# Patient Record
Sex: FEMALE | Race: WHITE | NOT HISPANIC OR LATINO | Employment: FULL TIME | URBAN - METROPOLITAN AREA
[De-identification: names, ages, dates, MRNs, and addresses within clinical notes are randomized per-mention and may not be internally consistent; named-entity substitution may affect disease eponyms.]

---

## 2017-04-14 ENCOUNTER — TRANSFERRED RECORDS (OUTPATIENT)
Dept: HEALTH INFORMATION MANAGEMENT | Facility: CLINIC | Age: 42
End: 2017-04-14

## 2017-05-31 ENCOUNTER — TRANSFERRED RECORDS (OUTPATIENT)
Dept: HEALTH INFORMATION MANAGEMENT | Facility: CLINIC | Age: 42
End: 2017-05-31

## 2017-06-30 ENCOUNTER — TRANSFERRED RECORDS (OUTPATIENT)
Dept: HEALTH INFORMATION MANAGEMENT | Facility: CLINIC | Age: 42
End: 2017-06-30

## 2017-07-19 ENCOUNTER — TRANSFERRED RECORDS (OUTPATIENT)
Dept: HEALTH INFORMATION MANAGEMENT | Facility: CLINIC | Age: 42
End: 2017-07-19

## 2020-03-11 ENCOUNTER — TRANSFERRED RECORDS (OUTPATIENT)
Dept: HEALTH INFORMATION MANAGEMENT | Facility: CLINIC | Age: 45
End: 2020-03-11

## 2020-06-26 ENCOUNTER — TRANSFERRED RECORDS (OUTPATIENT)
Dept: HEALTH INFORMATION MANAGEMENT | Facility: CLINIC | Age: 45
End: 2020-06-26

## 2020-12-28 ENCOUNTER — TRANSFERRED RECORDS (OUTPATIENT)
Dept: HEALTH INFORMATION MANAGEMENT | Facility: CLINIC | Age: 45
End: 2020-12-28

## 2020-12-29 ENCOUNTER — TRANSFERRED RECORDS (OUTPATIENT)
Dept: HEALTH INFORMATION MANAGEMENT | Facility: CLINIC | Age: 45
End: 2020-12-29

## 2020-12-31 ENCOUNTER — TRANSFERRED RECORDS (OUTPATIENT)
Dept: HEALTH INFORMATION MANAGEMENT | Facility: CLINIC | Age: 45
End: 2020-12-31

## 2021-01-04 ENCOUNTER — MEDICAL CORRESPONDENCE (OUTPATIENT)
Dept: HEALTH INFORMATION MANAGEMENT | Facility: CLINIC | Age: 46
End: 2021-01-04

## 2021-01-05 ENCOUNTER — TRANSCRIBE ORDERS (OUTPATIENT)
Dept: OTHER | Age: 46
End: 2021-01-05

## 2021-01-05 ENCOUNTER — PATIENT OUTREACH (OUTPATIENT)
Dept: ONCOLOGY | Facility: CLINIC | Age: 46
End: 2021-01-05

## 2021-01-05 ENCOUNTER — DOCUMENTATION ONLY (OUTPATIENT)
Dept: ONCOLOGY | Facility: CLINIC | Age: 46
End: 2021-01-05

## 2021-01-05 DIAGNOSIS — D07.1 VIN III (VULVAR INTRAEPITHELIAL NEOPLASIA III): Primary | ICD-10-CM

## 2021-01-05 NOTE — PROGRESS NOTES
Action    Action Taken 1/5/21:    -Spoke w/ Patient:    -[facility]. Dr. Ban Mcwilliams. Last seen in 2017.     -Biopsy done around 2015 in clinic (Gyn/Fertility). No other bx/diagnostic/imaging done since 2015    -No imaging done. Has imaging scheduled, but advised me is it     -Emailed pt ASHLEY to pt for [facility] recs.     -[facility] Recs pulled thru CE    1/6/21: Records from [facility] rec'd - sent to HIM for upload    -1/13/21: Slides from [facility] rec'd, sent to 5th floor lab @ [facility] [place]  2:53 PM       RECORDS STATUS - ALL OTHER DIAGNOSIS      RECORDS RECEIVED FROM: [facility] ([facility])   DATE RECEIVED:    NOTES STATUS DETAILS   OFFICE NOTE from referring provider CE - Cristiane Siddiqui: 12/28/20    Recs from [facility] recs/Dr. Ban Mcwilliams sent to HIM for upload 1/6   OFFICE NOTE from medical oncologist     DISCHARGE SUMMARY from hospital     DISCHARGE REPORT from the ER     OPERATIVE REPORT     MEDICATION LIST     CLINICAL TRIAL TREATMENTS TO DATE     LABS     PATHOLOGY REPORTS [facility] Reports in CE 12/28/20: [id number]   ANYTHING RELATED TO DIAGNOSIS     GENONOMIC TESTING     TYPE:     IMAGING (NEED IMAGES & REPORT)     CT SCANS     MRI     MAMMO     ULTRASOUND     PET

## 2021-01-05 NOTE — PROGRESS NOTES
New Patient Oncology Nurse Navigator Note     Referring provider: Brandon Siddiqui MD    Referring Clinic/Organization: [facility]      Referred to: Gyn/Onc    Requested provider (if applicable): First available - did not specify     Referral Received: 1/5/21     Evaluation for : DEVIN III (vulvar intraepithelial neoplasia III)     Clinical History (per Nurse review of records provided):      Patient presented to Gyn with two month history of chronic vulvar symptoms, refractory to any topical treatment. Vulvar biopsy was performed, pathology showed: HIGH GRADE SQUAMOUS INTRAEPITHELIAL LESION (SEVERE DYSPLASIA/VULVAR INTRAEPITHELIAL NEOPLASIA III).    Clinical Assessment / Barriers to Care (Per Nurse):    Spoke with patient, she is ready for scheduling. Confirmed with patient that she has not had a pelvic US as ordered by Dr. Siddiqui at the 12/28 visit, that was unrelated to skin concern and was for perimenopausal bleeding per patient. Patient went in for US and they recommended transvaginal, patient unable to do due to pain from biopsy sites. Pelvic US is deferred for now. Of note, patient requests female Gyn Onc provider.    Records Location: [facility] Everywhere   Faxed - Media tab/Scanned     Records Needed:     None    Additional testing needed prior to consult:     None    Referral updates and Plan:     Referred on for scheduling.      Summer Vance, RN, BSN, OCN    [facility]  [phone]

## 2021-01-06 NOTE — TELEPHONE ENCOUNTER
ONCOLOGY INTAKE: Records Information      APPT INFORMATION:  Referring provider:  Dr. Brandon Mello  Referring provider s clinic:  [facility]  Reason for visit/diagnosis:  DEVIN III (vulvar intraepithelial neoplasia III)  Has patient been notified of appointment date and time?: Yes    RECORDS INFORMATION:  Were the records received with the referral (via Rightfax)? No,Internal Referral      Has patient been seen for any external appt for this diagnosis? No    If yes, where? NA      ADDITIONAL INFORMATION:  None

## 2021-01-14 ENCOUNTER — ONCOLOGY VISIT (OUTPATIENT)
Dept: ONCOLOGY | Facility: CLINIC | Age: 46
End: 2021-01-14
Attending: OBSTETRICS & GYNECOLOGY
Payer: COMMERCIAL

## 2021-01-14 ENCOUNTER — PRE VISIT (OUTPATIENT)
Dept: ONCOLOGY | Facility: CLINIC | Age: 46
End: 2021-01-14

## 2021-01-14 VITALS
OXYGEN SATURATION: 98 % | SYSTOLIC BLOOD PRESSURE: 170 MMHG | BODY MASS INDEX: 27.11 KG/M2 | RESPIRATION RATE: 16 BRPM | WEIGHT: 153 LBS | TEMPERATURE: 98 F | HEART RATE: 110 BPM | HEIGHT: 63 IN | DIASTOLIC BLOOD PRESSURE: 89 MMHG

## 2021-01-14 DIAGNOSIS — D07.1 VIN III (VULVAR INTRAEPITHELIAL NEOPLASIA III): ICD-10-CM

## 2021-01-14 DIAGNOSIS — B37.31 YEAST INFECTION OF THE VAGINA: Primary | ICD-10-CM

## 2021-01-14 PROCEDURE — G0463 HOSPITAL OUTPT CLINIC VISIT: HCPCS | Mod: 25

## 2021-01-14 PROCEDURE — G0145 SCR C/V CYTO,THINLAYER,RESCR: HCPCS | Performed by: OBSTETRICS & GYNECOLOGY

## 2021-01-14 PROCEDURE — 999N001032 HC STATISTIC REVIEW OUTSIDE SLIDES TC 88321: Performed by: OBSTETRICS & GYNECOLOGY

## 2021-01-14 PROCEDURE — G0463 HOSPITAL OUTPT CLINIC VISIT: HCPCS

## 2021-01-14 PROCEDURE — 87624 HPV HI-RISK TYP POOLED RSLT: CPT | Performed by: OBSTETRICS & GYNECOLOGY

## 2021-01-14 PROCEDURE — 99205 OFFICE O/P NEW HI 60 MIN: CPT | Performed by: OBSTETRICS & GYNECOLOGY

## 2021-01-14 RX ORDER — SUMATRIPTAN 50 MG/1
TABLET, FILM COATED ORAL
COMMUNITY
Start: 2019-12-11 | End: 2023-03-17

## 2021-01-14 RX ORDER — MULTIPLE VITAMINS W/ MINERALS TAB 9MG-400MCG
1 TAB ORAL DAILY
COMMUNITY

## 2021-01-14 RX ORDER — SPIRONOLACTONE 50 MG/1
50 TABLET, FILM COATED ORAL
COMMUNITY
Start: 2020-10-06

## 2021-01-14 RX ORDER — CHLORAL HYDRATE 500 MG
1 CAPSULE ORAL
COMMUNITY

## 2021-01-14 RX ORDER — OMEGA-3 FATTY ACIDS/FISH OIL 300-1000MG
200 CAPSULE ORAL EVERY 4 HOURS PRN
Status: ON HOLD | COMMUNITY
End: 2021-01-29

## 2021-01-14 RX ORDER — ZINC GLUCONATE 50 MG
50 TABLET ORAL DAILY
COMMUNITY
End: 2023-03-17

## 2021-01-14 RX ORDER — METHOCARBAMOL 750 MG/1
750 TABLET, FILM COATED ORAL
COMMUNITY
Start: 2020-06-18

## 2021-01-14 RX ORDER — FLUCONAZOLE 150 MG/1
150 TABLET ORAL ONCE
Qty: 1 TABLET | Refills: 1 | Status: SHIPPED | OUTPATIENT
Start: 2021-01-14 | End: 2021-01-14

## 2021-01-14 RX ORDER — AZELASTINE 1 MG/ML
1 SPRAY, METERED NASAL
COMMUNITY

## 2021-01-14 RX ORDER — CEFDINIR 300 MG/1
300 CAPSULE ORAL 2 TIMES DAILY
COMMUNITY
Start: 2021-01-07 | End: 2021-01-27

## 2021-01-14 RX ORDER — LIDOCAINE HYDROCHLORIDE 20 MG/ML
JELLY TOPICAL
COMMUNITY
Start: 2020-12-31 | End: 2023-03-17

## 2021-01-14 RX ORDER — DROSPIRENONE, ETHINYL ESTRADIOL AND LEVOMEFOLATE CALCIUM AND LEVOMEFOLATE CALCIUM 3-0.02(24)
1 KIT ORAL
COMMUNITY
Start: 2020-10-12

## 2021-01-14 RX ORDER — CEFAZOLIN SODIUM 2 G/50ML
2 SOLUTION INTRAVENOUS
Status: CANCELLED | OUTPATIENT
Start: 2021-01-14

## 2021-01-14 RX ORDER — FLUCONAZOLE 150 MG/1
TABLET ORAL
COMMUNITY
Start: 2021-01-07

## 2021-01-14 RX ORDER — LEVOTHYROXINE SODIUM 75 UG/1
TABLET ORAL
COMMUNITY
Start: 2020-12-31

## 2021-01-14 RX ORDER — VALACYCLOVIR HYDROCHLORIDE 1 G/1
1000 TABLET, FILM COATED ORAL DAILY
COMMUNITY
Start: 2020-04-06

## 2021-01-14 RX ORDER — CEFAZOLIN SODIUM 1 G/50ML
1 INJECTION, SOLUTION INTRAVENOUS SEE ADMIN INSTRUCTIONS
Status: CANCELLED | OUTPATIENT
Start: 2021-01-14

## 2021-01-14 SDOH — HEALTH STABILITY: MENTAL HEALTH: HOW MANY STANDARD DRINKS CONTAINING ALCOHOL DO YOU HAVE ON A TYPICAL DAY?: NOT ASKED

## 2021-01-14 SDOH — HEALTH STABILITY: MENTAL HEALTH: HOW OFTEN DO YOU HAVE A DRINK CONTAINING ALCOHOL?: 2-4 TIMES A MONTH

## 2021-01-14 SDOH — HEALTH STABILITY: MENTAL HEALTH: HOW OFTEN DO YOU HAVE 6 OR MORE DRINKS ON ONE OCCASION?: NOT ASKED

## 2021-01-14 ASSESSMENT — MIFFLIN-ST. JEOR: SCORE: 1318.13

## 2021-01-14 ASSESSMENT — PAIN SCALES - GENERAL: PAINLEVEL: NO PAIN (0)

## 2021-01-14 NOTE — NURSING NOTE
SURGERY CONSENT COMPLETED AND SCANNED     Relevant Diagnosis:   Teaching Topic: Colposcopy, Wide local excision of vulva         Person(s) involved in teaching :  Patient Alone   Motivation Level:  Asks Questions:    Yes      Eager to Learn:     Yes     Cooperative:          Yes    Receptive (willing. Able to accept information):    Yes      Patient and those who are listed above demonstrates understanding of the following:   Reason for the appointment, diagnosis and treatment plan:   Yes   Knowledge of proper use of medications and conditions for which they are ordered (with special attention to potential side effects or drug interactions): Yes   Which situations necessitate calling provider and whom to contact: Yes         Nutritional needs and diet plan:  Yes      Pain management techniques:     Yes, Pain Scale   Diet:   Yes, [facility] Diet Instructions    Teaching Concerns addressed: Yes    Infection Prevention:  Patient and those who are listed above demonstrate understanding of the following:  Pre-Op CHG Bathing Instructions: Yes  Surgical procedure site care taught:   Yes   Signs and symptoms of infection taught: Yes       Instructional Materials Used/Given:  The [place] Before You Surgery Booklet  Showering or Bathing before Surgery Instructions & CHG Product  Pain Assessment Tool   Home Care after Major Abdominal or Vaginal Surgery  Map  Copy of Surgical Consent    Comments:  JACKI Hayward RN

## 2021-01-14 NOTE — PROGRESS NOTES
GYNECOLOGIC  ONCOLOGY CONSULT      RE: Sushila Nicholson  : 1975  AMELIE: 2021    CC: High grade vulva dysplasia    HPI: Ms Sushila Nicholson is a 45 year old  female who presents for consultation regarding DEVIN III.     Today she is referred by Dr. Brandon Siddiqui with the below work up.    Patient reports recent symptoms of vulva irritation sine . Previous attempt at treatment with estradiol, steroid and antifungal cream did not resolve the issue. She continues to experience persistent and severe discomfort. She is under treatment for hypothyroid disease. She also experiences yeast infection with any antibiotic treatment. She is completing a course of antibiotics for sinus infection and has Diflucan prescription to take.  LMP in 2020. She was scheduled to undergo a vaginal pelvic US but could not tolerate this due to the vulva lesions, she prefers to follow up on this issue separately with her local OB/GYN    20 Lesion along perianal area (midline and along right)  Biopsy- high grade squamous intraepithelial lesion  HSV Type 1/2 negative    OBGYN history and Health Maintenance:  G0  Last Pap Smear: 2017 reports as Neg HPV negative  HPV Vaccine in her 20's      Review of Systems:  Systemic:No weight changes.    Skin : No skin changes or new lesions.   Eye : No changes in vision.   Pulmonary: No cough or SOB.   Cardiovascular: No CP or palpitations  Gastrointestinal : No diarrhea, constipation, abdominal pain. Bowel habits normal.   Genitourinary: No dysuria, urgency or bleeding, vulva pruritis  Psychiatric: No depression or anxiety   Hematologic : No palpable lymph nodes.   Endocrine : No hot flashes. No heat/cold intolerance.      Neurological: No headaches, no numbness.     Past Medical History:   Diagnosis Date     Acquired hypothyroidism      Anxiety      Bilateral occipital neuralgia      Disorder of thyroid        Past Surgical History:   Procedure Laterality Date     BREAST BIOPSY, CORE RT/LT             Current Outpatient Medications   Medication Sig Dispense Refill     azelastine (ASTELIN) 0.1 % nasal spray Spray 1 spray in nostril       cefdinir (OMNICEF) 300 MG capsule Take 300 mg by mouth 2 times daily       DHEA 25 MG CAPS Take 5 mg by mouth       fish oil-omega-3 fatty acids 1000 MG capsule Take 1 capsule by mouth       fluconazole (DIFLUCAN) 150 MG tablet        ibuprofen (ADVIL/MOTRIN) 200 MG capsule Take 200 mg by mouth every 4 hours as needed for fever       methocarbamol (ROBAXIN) 750 MG tablet Take 750 mg by mouth       multivitamin w/minerals (MULTI-VITAMIN) tablet Take 1 tablet by mouth daily       naltrexone (REVIA) 1 mg/mL SOLN Take by mouth once 0.5 AM 4.5 PM       Probiotic Product (PROBIOTIC-10 PO)        spironolactone (ALDACTONE) 100 MG tablet Take 100 mg by mouth       SYNTHROID 100 MCG tablet TAKE 1 TABLET BY MOUTH ONCE DAILY IN THE MORNING ON AN EMPTY STOMACH       vitamin D3 (CHOLECALCIFEROL) 1.25 MG (63316 UT) capsule        zinc gluconate 50 MG tablet Take 50 mg by mouth daily       Drospiren-Eth Estrad-Levomefol 3-0.02-0.451 MG TABS Take 1 tablet by mouth       lidocaine (XYLOCAINE) 2 % external gel AS DIRECTED FOR 1 DOSE       SUMAtriptan (IMITREX) 50 MG tablet TAKE 1 TABLET(50 MG) BY MOUTH AT ONSET OF HEADACHE AS DIRECTED. MAY REPEAT IN 2 HOURS AS NEEDED       valACYclovir (VALTREX) 1000 mg tablet Take 1,000 mg by mouth daily           Allergies   Allergen Reactions     Azithromycin Hives       Social History:  Social History     Tobacco Use     Smoking status: Never Smoker     Smokeless tobacco: Never Used   Substance Use Topics     Alcohol use: Yes     Frequency: 2-4 times a month         Family History:   The patient's family history is notable for no ovarian, breast or colon cancer  History reviewed. No pertinent family history.      Physical Exam:     BP (!) 170/89 (BP Location: Right arm, Patient Position: Chair, Cuff Size: Adult Regular)   Pulse 110   Temp 98  F  "(36.7  C)   Resp 16   Ht 1.6 m (5' 3\")   Wt 69.4 kg (153 lb)   LMP 09/14/2020   SpO2 98%   BMI 27.10 kg/m    Body mass index is 27.1 kg/m .    General: Alert and oriented, no acute distress  Psych: Mood stable  Cardiovascular: RRR, no murmors  Pulmonary: Lungs clear . Normal respiratory effort  GI: No distention. No masses. No hernia.   Lymph: No enlarge lymph nodes in neck or groin  : Normal external genitalia. Vagina without lesion, small cervix, midline uterus no adnexal mass.    Vulva visualized after application with acetic acid- aceto-white epithelium along posterior left posterior vulva extending to fourchette, lesion 2 cm.      Assessment: Sushila Nicholson is a 45 year old woman with a new diagnosis of high grade vulva dysplasia confirmed by biopsy.    Vulva area visualized confined to perineum and extending to left distal vulva, vagina without lesions.    Cervical cancer screening- pap obtained today     Plan:     1.)   Discussed treatment recommendation with surgery to excise the high grade lesion with goal to obtain a negative margin. There is limited skin in this area. Discussed risk and benefits of surgery including risk of infection. Surgical consent obtained.     Reviewed management options for wound care after surgery.     2.)    Patient will follow up for the Pelvic US with her Gyn team    3.) Labs and/or tests ordered include:  UPT morning of surgery       Edilma Daley M.D., MPH,  F.A.C.O.G.  Professor  Department of Ob/Gyn and Women's Health  Division of Gynecologic Oncology  [facility]  [phone]        A total of 60 minutes was spent with the patient, > 50% of time was spent  in counseling the patient and/or treatment planning.    "

## 2021-01-14 NOTE — NURSING NOTE
"Oncology Rooming Note    January 14, 2021 10:54 AM   Sushila Nicholson is a 45 year old female who presents for:    Chief Complaint   Patient presents with     Oncology Clinic Visit     [facility] 3     Initial Vitals: BP (!) 170/89 (BP Location: Right arm, Patient Position: Chair, Cuff Size: Adult Regular)   Pulse 110   Temp 98  F (36.7  C)   Resp 16   Ht 1.6 m (5' 3\")   Wt 69.4 kg (153 lb)   LMP 09/14/2020   SpO2 98%   BMI 27.10 kg/m   Estimated body mass index is 27.1 kg/m  as calculated from the following:    Height as of this encounter: 1.6 m (5' 3\").    Weight as of this encounter: 69.4 kg (153 lb). Body surface area is 1.76 meters squared.  No Pain (0) Comment: Data Unavailable   Patient's last menstrual period was 09/14/2020.  Allergies reviewed: Yes  Medications reviewed: Yes    Medications: Medication refills not needed today.  Pharmacy name entered into [facility]:    [id number] - [place], [place] - [address] AT [facility] OF [address]  [id number] - [place], [place] - [address]    Clinical concerns: No new concerns. Thuy was notified.      Sridhar Jarrett LPN            "

## 2021-01-15 ENCOUNTER — TELEPHONE (OUTPATIENT)
Dept: ONCOLOGY | Facility: CLINIC | Age: 46
End: 2021-01-15

## 2021-01-15 DIAGNOSIS — Z11.59 ENCOUNTER FOR SCREENING FOR OTHER VIRAL DISEASES: Primary | ICD-10-CM

## 2021-01-15 PROBLEM — D07.1 VIN III (VULVAR INTRAEPITHELIAL NEOPLASIA III): Status: ACTIVE | Noted: 2021-01-15

## 2021-01-15 PROCEDURE — 88321 CONSLTJ&REPRT SLD PREP ELSWR: CPT | Performed by: PATHOLOGY

## 2021-01-15 NOTE — TELEPHONE ENCOUNTER
Surgery is scheduled with Dr. Daley on 1/29 at [place].  Scheduled per openings.    H&P: to be completed by Dr. Daley or PCP.    COVID-19 test: will be at [facility]   P: [phone]  F: [phone]  COVID orders will be faxed to them and Sushila will get the COVID test on 1/25. I spoke with the clinic and they said they get results within 3 days and can have the results faxed to us by 1/29.    Post-op: 2/18 as a in person visit.    The RN completed the education regarding the surgery.     Patient will receive a phone call from pre-admission nurses 1-2 days prior to surgery with arrival and start time.    The surgery packet was provided by the RN during appointment.    Patient will contact clinic near home to schedule COVID-19 test 2-4 days prior to surgery.    I called the patient and was able to confirm the scheduled information above.

## 2021-01-18 LAB — COPATH REPORT: NORMAL

## 2021-01-19 LAB
COPATH REPORT: NORMAL
PAP: NORMAL

## 2021-01-20 LAB
FINAL DIAGNOSIS: ABNORMAL
HPV HR 12 DNA CVX QL NAA+PROBE: NEGATIVE
HPV16 DNA SPEC QL NAA+PROBE: NEGATIVE
HPV18 DNA SPEC QL NAA+PROBE: POSITIVE
SPECIMEN DESCRIPTION: ABNORMAL
SPECIMEN SOURCE CVX/VAG CYTO: ABNORMAL

## 2021-01-22 ENCOUNTER — TELEPHONE (OUTPATIENT)
Dept: TRANSPLANT | Facility: CLINIC | Age: 46
End: 2021-01-22

## 2021-01-22 ENCOUNTER — TELEPHONE (OUTPATIENT)
Dept: ONCOLOGY | Facility: CLINIC | Age: 46
End: 2021-01-22

## 2021-01-22 NOTE — TELEPHONE ENCOUNTER
Sushila left voicemail that [place] did not get COVID orders.    Spoke with [place], who knew fax went through. Already left for the day but would ask someone in the clinic to fax orders again.    Called Sushila to explain this, also noted that this is why we try to do COVID tests within our system.    Scheduled a COVID test on 1/27 at [place] as back up so that if she does not get in at [place] on 1/25, she will do the test within our system.     No further questions.

## 2021-01-22 NOTE — TELEPHONE ENCOUNTER
----- Message from Bhavani Hayward RN sent at 1/22/2021  4:12 PM CST -----  Regarding: FAX ORDER URGENT  Patient clinic did not get fax    Can you re-fax this as her appt is Monday morning to assure surgery goes as planned.    Please fax the COVID orders to - Fax #[phone]    Can you add a note to the cover letter fax the results back to us at - Fax #[phone]    THANK YOU    Juli  ----- Message -----  From: Melinda Cage  Sent: 1/15/2021   1:01 PM CST  To: Bhavani Hayward, RN    [facility] said they can get the results sent to us within the 4 day window.    Please fax the COVID orders to - Fax #[phone]    Can you add a note to fax the results back to us at - Fax #[phone]    Also can you add that the test is needed on 1/25? :-)

## 2021-01-25 ENCOUNTER — TELEPHONE (OUTPATIENT)
Dept: ONCOLOGY | Facility: CLINIC | Age: 46
End: 2021-01-25

## 2021-01-25 NOTE — TELEPHONE ENCOUNTER
Received VM from Sushila.    Did her COVID test in [place] this morning.    Clinic stated they didn't get a fax # to send results to (but this is on the cover sheet) and so Sushila provided them with [phone].    Message to RN to watch for fax with results.

## 2021-01-26 ENCOUNTER — PATIENT OUTREACH (OUTPATIENT)
Dept: ONCOLOGY | Facility: CLINIC | Age: 46
End: 2021-01-26

## 2021-01-26 NOTE — PROGRESS NOTES
Per Care Everywhere 1/25/2021:     COVID-19/SARS-CoV2, PCR or NAAT Negative     Surgery as planned.    Bhavani Hayward RN

## 2021-01-28 ENCOUNTER — ANESTHESIA EVENT (OUTPATIENT)
Dept: SURGERY | Facility: CLINIC | Age: 46
End: 2021-01-28
Payer: COMMERCIAL

## 2021-01-28 ENCOUNTER — TRANSFERRED RECORDS (OUTPATIENT)
Dept: HEALTH INFORMATION MANAGEMENT | Facility: CLINIC | Age: 46
End: 2021-01-28

## 2021-01-29 ENCOUNTER — ANESTHESIA (OUTPATIENT)
Dept: SURGERY | Facility: CLINIC | Age: 46
End: 2021-01-29
Payer: COMMERCIAL

## 2021-01-29 ENCOUNTER — HOSPITAL ENCOUNTER (OUTPATIENT)
Facility: CLINIC | Age: 46
Discharge: HOME | End: 2021-01-29
Attending: OBSTETRICS & GYNECOLOGY | Admitting: OBSTETRICS & GYNECOLOGY
Payer: COMMERCIAL

## 2021-01-29 VITALS
HEIGHT: 63 IN | RESPIRATION RATE: 14 BRPM | TEMPERATURE: 98.5 F | OXYGEN SATURATION: 97 % | WEIGHT: 151.46 LBS | BODY MASS INDEX: 26.84 KG/M2 | SYSTOLIC BLOOD PRESSURE: 129 MMHG | HEART RATE: 95 BPM | DIASTOLIC BLOOD PRESSURE: 79 MMHG

## 2021-01-29 DIAGNOSIS — D07.1 VIN III (VULVAR INTRAEPITHELIAL NEOPLASIA III): ICD-10-CM

## 2021-01-29 DIAGNOSIS — D07.1 VIN III (VULVAR INTRAEPITHELIAL NEOPLASIA III): Primary | ICD-10-CM

## 2021-01-29 LAB
B-HCG SERPL-ACNC: <1 IU/L (ref 0–5)
GLUCOSE BLDC GLUCOMTR-MCNC: 81 MG/DL (ref 70–99)
HCG UR QL: NEGATIVE
HGB BLD-MCNC: 15.4 G/DL (ref 11.7–15.7)

## 2021-01-29 PROCEDURE — 250N000013 HC RX MED GY IP 250 OP 250 PS 637: Performed by: ANESTHESIOLOGY

## 2021-01-29 PROCEDURE — 710N000012 HC RECOVERY PHASE 2, PER MINUTE: Performed by: OBSTETRICS & GYNECOLOGY

## 2021-01-29 PROCEDURE — 88305 TISSUE EXAM BY PATHOLOGIST: CPT | Mod: TC | Performed by: OBSTETRICS & GYNECOLOGY

## 2021-01-29 PROCEDURE — 999N001017 HC STATISTIC GLUCOSE BY METER IP

## 2021-01-29 PROCEDURE — 999N000141 HC STATISTIC PRE-PROCEDURE NURSING ASSESSMENT: Performed by: OBSTETRICS & GYNECOLOGY

## 2021-01-29 PROCEDURE — 81025 URINE PREGNANCY TEST: CPT | Performed by: ANESTHESIOLOGY

## 2021-01-29 PROCEDURE — 360N000075 HC SURGERY LEVEL 2, PER MIN: Performed by: OBSTETRICS & GYNECOLOGY

## 2021-01-29 PROCEDURE — 250N000009 HC RX 250: Performed by: OBSTETRICS & GYNECOLOGY

## 2021-01-29 PROCEDURE — 250N000009 HC RX 250: Performed by: NURSE ANESTHETIST, CERTIFIED REGISTERED

## 2021-01-29 PROCEDURE — 84702 CHORIONIC GONADOTROPIN TEST: CPT | Performed by: OBSTETRICS & GYNECOLOGY

## 2021-01-29 PROCEDURE — 272N000001 HC OR GENERAL SUPPLY STERILE: Performed by: OBSTETRICS & GYNECOLOGY

## 2021-01-29 PROCEDURE — 250N000011 HC RX IP 250 OP 636: Performed by: OBSTETRICS & GYNECOLOGY

## 2021-01-29 PROCEDURE — 250N000011 HC RX IP 250 OP 636: Performed by: NURSE ANESTHETIST, CERTIFIED REGISTERED

## 2021-01-29 PROCEDURE — 36415 COLL VENOUS BLD VENIPUNCTURE: CPT | Performed by: OBSTETRICS & GYNECOLOGY

## 2021-01-29 PROCEDURE — 88309 TISSUE EXAM BY PATHOLOGIST: CPT | Mod: 26 | Performed by: PATHOLOGY

## 2021-01-29 PROCEDURE — 258N000003 HC RX IP 258 OP 636: Performed by: NURSE ANESTHETIST, CERTIFIED REGISTERED

## 2021-01-29 PROCEDURE — 88309 TISSUE EXAM BY PATHOLOGIST: CPT | Mod: TC | Performed by: OBSTETRICS & GYNECOLOGY

## 2021-01-29 PROCEDURE — 85018 HEMOGLOBIN: CPT | Performed by: OBSTETRICS & GYNECOLOGY

## 2021-01-29 PROCEDURE — 710N000010 HC RECOVERY PHASE 1, LEVEL 2, PER MIN: Performed by: OBSTETRICS & GYNECOLOGY

## 2021-01-29 PROCEDURE — 370N000017 HC ANESTHESIA TECHNICAL FEE, PER MIN: Performed by: OBSTETRICS & GYNECOLOGY

## 2021-01-29 PROCEDURE — 258N000003 HC RX IP 258 OP 636: Performed by: OBSTETRICS & GYNECOLOGY

## 2021-01-29 PROCEDURE — 250N000025 HC SEVOFLURANE, PER MIN: Performed by: OBSTETRICS & GYNECOLOGY

## 2021-01-29 PROCEDURE — 88305 TISSUE EXAM BY PATHOLOGIST: CPT | Mod: 26 | Performed by: PATHOLOGY

## 2021-01-29 RX ORDER — FENTANYL CITRATE 50 UG/ML
25-50 INJECTION, SOLUTION INTRAMUSCULAR; INTRAVENOUS
Status: DISCONTINUED | OUTPATIENT
Start: 2021-01-29 | End: 2021-01-29 | Stop reason: HOSPADM

## 2021-01-29 RX ORDER — NALOXONE HYDROCHLORIDE 0.4 MG/ML
0.2 INJECTION, SOLUTION INTRAMUSCULAR; INTRAVENOUS; SUBCUTANEOUS
Status: DISCONTINUED | OUTPATIENT
Start: 2021-01-29 | End: 2021-01-29 | Stop reason: HOSPADM

## 2021-01-29 RX ORDER — DEXMEDETOMIDINE HYDROCHLORIDE 4 UG/ML
0.2-1.2 INJECTION, SOLUTION INTRAVENOUS CONTINUOUS
Status: DISCONTINUED | OUTPATIENT
Start: 2021-01-29 | End: 2021-01-29 | Stop reason: HOSPADM

## 2021-01-29 RX ORDER — ACETAMINOPHEN 325 MG/1
650 TABLET ORAL EVERY 4 HOURS PRN
Qty: 50 TABLET | Refills: 0 | Status: SHIPPED | OUTPATIENT
Start: 2021-01-29

## 2021-01-29 RX ORDER — ACETAMINOPHEN 325 MG/1
975 TABLET ORAL ONCE
Status: COMPLETED | OUTPATIENT
Start: 2021-01-29 | End: 2021-01-29

## 2021-01-29 RX ORDER — KETOROLAC TROMETHAMINE 30 MG/ML
INJECTION, SOLUTION INTRAMUSCULAR; INTRAVENOUS PRN
Status: DISCONTINUED | OUTPATIENT
Start: 2021-01-29 | End: 2021-01-29

## 2021-01-29 RX ORDER — MEPERIDINE HYDROCHLORIDE 25 MG/ML
12.5 INJECTION INTRAMUSCULAR; INTRAVENOUS; SUBCUTANEOUS
Status: DISCONTINUED | OUTPATIENT
Start: 2021-01-29 | End: 2021-01-29 | Stop reason: HOSPADM

## 2021-01-29 RX ORDER — ONDANSETRON 2 MG/ML
INJECTION INTRAMUSCULAR; INTRAVENOUS PRN
Status: DISCONTINUED | OUTPATIENT
Start: 2021-01-29 | End: 2021-01-29

## 2021-01-29 RX ORDER — CEFAZOLIN SODIUM 2 G/100ML
2 INJECTION, SOLUTION INTRAVENOUS
Status: COMPLETED | OUTPATIENT
Start: 2021-01-29 | End: 2021-01-29

## 2021-01-29 RX ORDER — CEFAZOLIN SODIUM 1 G/3ML
1 INJECTION, POWDER, FOR SOLUTION INTRAMUSCULAR; INTRAVENOUS SEE ADMIN INSTRUCTIONS
Status: DISCONTINUED | OUTPATIENT
Start: 2021-01-29 | End: 2021-01-29 | Stop reason: HOSPADM

## 2021-01-29 RX ORDER — ONDANSETRON 4 MG/1
4 TABLET, ORALLY DISINTEGRATING ORAL EVERY 30 MIN PRN
Status: DISCONTINUED | OUTPATIENT
Start: 2021-01-29 | End: 2021-01-29 | Stop reason: HOSPADM

## 2021-01-29 RX ORDER — FENTANYL CITRATE 50 UG/ML
INJECTION, SOLUTION INTRAMUSCULAR; INTRAVENOUS PRN
Status: DISCONTINUED | OUTPATIENT
Start: 2021-01-29 | End: 2021-01-29

## 2021-01-29 RX ORDER — NALOXONE HYDROCHLORIDE 0.4 MG/ML
0.4 INJECTION, SOLUTION INTRAMUSCULAR; INTRAVENOUS; SUBCUTANEOUS
Status: DISCONTINUED | OUTPATIENT
Start: 2021-01-29 | End: 2021-01-29 | Stop reason: HOSPADM

## 2021-01-29 RX ORDER — SODIUM CHLORIDE, SODIUM LACTATE, POTASSIUM CHLORIDE, CALCIUM CHLORIDE 600; 310; 30; 20 MG/100ML; MG/100ML; MG/100ML; MG/100ML
INJECTION, SOLUTION INTRAVENOUS CONTINUOUS PRN
Status: DISCONTINUED | OUTPATIENT
Start: 2021-01-29 | End: 2021-01-29

## 2021-01-29 RX ORDER — IBUPROFEN 600 MG/1
600 TABLET, FILM COATED ORAL EVERY 6 HOURS PRN
Qty: 30 TABLET | Refills: 0 | Status: SHIPPED | OUTPATIENT
Start: 2021-01-29

## 2021-01-29 RX ORDER — DEXAMETHASONE SODIUM PHOSPHATE 4 MG/ML
INJECTION, SOLUTION INTRA-ARTICULAR; INTRALESIONAL; INTRAMUSCULAR; INTRAVENOUS; SOFT TISSUE PRN
Status: DISCONTINUED | OUTPATIENT
Start: 2021-01-29 | End: 2021-01-29

## 2021-01-29 RX ORDER — SODIUM CHLORIDE, SODIUM LACTATE, POTASSIUM CHLORIDE, CALCIUM CHLORIDE 600; 310; 30; 20 MG/100ML; MG/100ML; MG/100ML; MG/100ML
INJECTION, SOLUTION INTRAVENOUS CONTINUOUS
Status: DISCONTINUED | OUTPATIENT
Start: 2021-01-29 | End: 2021-01-29 | Stop reason: HOSPADM

## 2021-01-29 RX ORDER — ONDANSETRON 2 MG/ML
4 INJECTION INTRAMUSCULAR; INTRAVENOUS EVERY 30 MIN PRN
Status: DISCONTINUED | OUTPATIENT
Start: 2021-01-29 | End: 2021-01-29 | Stop reason: HOSPADM

## 2021-01-29 RX ORDER — PROPOFOL 10 MG/ML
INJECTION, EMULSION INTRAVENOUS PRN
Status: DISCONTINUED | OUTPATIENT
Start: 2021-01-29 | End: 2021-01-29

## 2021-01-29 RX ORDER — NALTREXONE HYDROCHLORIDE 50 MG/1
0.5 TABLET, FILM COATED ORAL DAILY
COMMUNITY
End: 2021-02-18

## 2021-01-29 RX ORDER — BUPIVACAINE HYDROCHLORIDE 2.5 MG/ML
INJECTION, SOLUTION INFILTRATION; PERINEURAL PRN
Status: DISCONTINUED | OUTPATIENT
Start: 2021-01-29 | End: 2021-01-29 | Stop reason: HOSPADM

## 2021-01-29 RX ORDER — KETAMINE HYDROCHLORIDE 10 MG/ML
INJECTION INTRAMUSCULAR; INTRAVENOUS PRN
Status: DISCONTINUED | OUTPATIENT
Start: 2021-01-29 | End: 2021-01-29

## 2021-01-29 RX ORDER — LIDOCAINE HYDROCHLORIDE 20 MG/ML
INJECTION, SOLUTION INFILTRATION; PERINEURAL PRN
Status: DISCONTINUED | OUTPATIENT
Start: 2021-01-29 | End: 2021-01-29

## 2021-01-29 RX ORDER — ACETAMINOPHEN 325 MG/1
650 TABLET ORAL
Status: DISCONTINUED | OUTPATIENT
Start: 2021-01-29 | End: 2021-01-29 | Stop reason: HOSPADM

## 2021-01-29 RX ADMIN — KETAMINE HYDROCHLORIDE 0.1 MG/KG/HR: 100 INJECTION, SOLUTION, CONCENTRATE INTRAMUSCULAR; INTRAVENOUS at 11:12

## 2021-01-29 RX ADMIN — ROCURONIUM BROMIDE 50 MG: 10 INJECTION INTRAVENOUS at 10:59

## 2021-01-29 RX ADMIN — Medication 30 MG: at 10:59

## 2021-01-29 RX ADMIN — FENTANYL CITRATE 50 MCG: 50 INJECTION, SOLUTION INTRAMUSCULAR; INTRAVENOUS at 11:03

## 2021-01-29 RX ADMIN — HYDROMORPHONE HYDROCHLORIDE 0.5 MG: 1 INJECTION, SOLUTION INTRAMUSCULAR; INTRAVENOUS; SUBCUTANEOUS at 11:25

## 2021-01-29 RX ADMIN — ACETAMINOPHEN 975 MG: 325 TABLET, FILM COATED ORAL at 09:52

## 2021-01-29 RX ADMIN — DEXMEDETOMIDINE 0.4 MCG/KG/HR: 100 INJECTION, SOLUTION, CONCENTRATE INTRAVENOUS at 11:11

## 2021-01-29 RX ADMIN — PROPOFOL 200 MG: 10 INJECTION, EMULSION INTRAVENOUS at 10:59

## 2021-01-29 RX ADMIN — ONDANSETRON 4 MG: 2 INJECTION INTRAMUSCULAR; INTRAVENOUS at 11:44

## 2021-01-29 RX ADMIN — FENTANYL CITRATE 50 MCG: 50 INJECTION, SOLUTION INTRAMUSCULAR; INTRAVENOUS at 10:54

## 2021-01-29 RX ADMIN — SUGAMMADEX 200 MG: 100 INJECTION, SOLUTION INTRAVENOUS at 12:12

## 2021-01-29 RX ADMIN — MIDAZOLAM 2 MG: 1 INJECTION INTRAMUSCULAR; INTRAVENOUS at 10:38

## 2021-01-29 RX ADMIN — KETOROLAC TROMETHAMINE 30 MG: 30 INJECTION, SOLUTION INTRAMUSCULAR at 12:08

## 2021-01-29 RX ADMIN — LIDOCAINE HYDROCHLORIDE 60 MG: 20 INJECTION, SOLUTION INFILTRATION; PERINEURAL at 10:59

## 2021-01-29 RX ADMIN — DEXAMETHASONE SODIUM PHOSPHATE 8 MG: 4 INJECTION, SOLUTION INTRA-ARTICULAR; INTRALESIONAL; INTRAMUSCULAR; INTRAVENOUS; SOFT TISSUE at 11:27

## 2021-01-29 RX ADMIN — CEFAZOLIN 2 G: 10 INJECTION, POWDER, FOR SOLUTION INTRAVENOUS at 11:14

## 2021-01-29 RX ADMIN — SODIUM CHLORIDE, POTASSIUM CHLORIDE, SODIUM LACTATE AND CALCIUM CHLORIDE: 600; 310; 30; 20 INJECTION, SOLUTION INTRAVENOUS at 10:25

## 2021-01-29 RX ADMIN — FENTANYL CITRATE 50 MCG: 50 INJECTION, SOLUTION INTRAMUSCULAR; INTRAVENOUS at 11:43

## 2021-01-29 ASSESSMENT — MIFFLIN-ST. JEOR: SCORE: 1311.13

## 2021-01-29 NOTE — OP NOTE
[facility]  Operative Note    Name: Sushila Nicholson  MRN: [id number]  : 1975  Date of Surgery: 2021    Pre-operative Diagnosis:   - Vulvar intraepithelial neoplasia III  - HPV 18 positive pap smear    Post-operative Diagnosis:   - Same, s/p below stated procedure  - Inadequate colposcopy    Procedure(s):   1. Exam under anesthesia  2. Colposcopy with cervical biopsy and endocervical curettage  3. Vulvar colposcopy  4. Wide local excision of vulva    Surgeon: Edilma Daley MD   Assistants: Bhavani Watson MD PGY-3    Anesthesia: General endotracheal, local  EBL: 10 mL   Urine Output: voided on call to the OR  Fluids: 1000 mL crystalloid    Specimens: cervical biopsy (4 o'clock position), endocervical curettage, vulvar perineum stitch at left apex.  Complications: None apparent  Indications: Sushila Nicholson is a 45 year old who initially presented with vulvar irritation that was refractory to topical medications. Ultimately, a vulvar biopsy was performed which demonstrated DEVIN III. A pap smear was also obtained which was NILM, HPV 18 positive. She elected to proceed with the above procedure. The risks and benefits of and alternatives to the procedure were discussed with the patient, all questions were answered and written informed consent was obtained.     Findings: Exam under anesthesia revealed normal vulvar architecture. Cervix and vagina smooth without nodularity/masses. Uterus small and mobile, no palpable adnexal masses. On coloposcopy, transformation zone not visualized. Upon application of acetic acid, small area of aceto-white changes and increased vascularity noted at the 4 o'clock position.  Biopsy obtained at this position. ECC obtained. On vulvar colposcopy, aceto-white changes noted along the posterior fourchette and perineum, greater density noted on the left. Hemostasis noted upon completion of the case.    Procedure Details: The patient was taken to the OR  where she was placed in the dorsal lithotomy position with feet in [place] stirrups. General anesthesia was administered. An exam under anesthesia was performed. The patient was then prepped and draped in the usual sterile fashion.    A speculum was placed in th vagina with excellent visualization of the cervix, which appeared normal. The transformation zone was not visible. Acetic acid was applied to the cervix. The colposcope was then utilized with a small area of aceto-white change noted at the 4 o'clock position. There was also increased vascularity in this area. A biopsy was obtained at this location using a Tischler biopsy forceps. The specimen was sent to pathology. Hemostasis was achieved with pressure. An endocervical curettage was performed and the specimen was sent to pathology. The speculum was then removed.    Attention was turned to the vulva. Acetic acid was applied and the colposcope was used to inspect the entire vulva. Aceto-white epithelium was noted involving the posterior vulva, left > right, extending to the posterior fourchette and perineum.  0.25% bupivacaine was then injected into the tissue overlying and immediately surrounding the abnormal tissue.  A wide area around the lesion was marked (approximately 4x3cm, butteryfly-shape) and a superficial incision made using a scalpel.  The incision was carried through the subcutaneous tissue with electrocautery.  The specimen was removed, marked at the left apex with suture, and sent to pathology.  The skin was then closely re-approximated with 4-0 vicryl using vertical mattress and running subcuticular sutures. Excellent re-approximation and hemostasis was noted.    Dr. Daley was present for the procedure. The patient tolerated the procedure well, was extubated in the OR, and transferred to the PACU in stable condition.    Bhavani Watson MD  Ob/Gyn PGY-3  January 29, 2021 4:59 PM    As the primary surgeon, I was scrubbed and present for the  full course of the procedure.    Edilma Daley M.D.

## 2021-01-29 NOTE — ANESTHESIA CARE TRANSFER NOTE
Patient: Sushila Nicholson    Procedure(s):  Colposcopy, Wide local excision of vulva, CERVICAL BIOPSY, ENDOMETRIAL CURRETTAGE,    Diagnosis: DEVIN III (vulvar intraepithelial neoplasia III) [D07.1]  Diagnosis Additional Information: No value filed.    Anesthesia Type:   General     Note:    Oropharynx: oropharynx clear of all foreign objects  Level of Consciousness: awake  Oxygen Supplementation: face mask  Level of Supplemental Oxygen: 6L  Independent Airway: airway patency satisfactory and stable  Dentition: dentition unchanged  Vital Signs Stable: post-procedure vital signs reviewed and stable  Report to RN Given: handoff report given  Patient transferred to: PACU  Comments: Patient transported to PACU, denies pain. Tachycardic, consistent with pre-induction HR. Spontaneously breathing.  Handoff Report: Identifed the Patient, Identified the Reponsible Provider, Reviewed the pertinent medical history, Discussed the surgical course, Reviewed Intra-OP anesthesia mangement and issues during anesthesia, Set expectations for post-procedure period and Allowed opportunity for questions and acknowledgement of understanding      Vitals: (Last set prior to Anesthesia Care Transfer)  CRNA VITALS  1/29/2021 1154 - 1/29/2021 1239      1/29/2021             EKG:  Sinus tachycardia        Electronically Signed By: LANA Hurst CRNA  January 29, 2021  12:39 PM

## 2021-01-29 NOTE — ANESTHESIA POSTPROCEDURE EVALUATION
Patient: Sushila Nicholson    Procedure(s):  Colposcopy, Wide local excision of vulva, CERVICAL BIOPSY, ENDOMETRIAL CURRETTAGE,    Diagnosis:DEVIN III (vulvar intraepithelial neoplasia III) [D07.1]  Diagnosis Additional Information: No value filed.    Anesthesia Type:  General    Note:  Disposition: Outpatient   Postop Pain Control: Uneventful            Sign Out: Well controlled pain   PONV: No   Neuro/Psych: Uneventful            Sign Out: Acceptable/Baseline neuro status   Airway/Respiratory: Uneventful            Sign Out: Acceptable/Baseline resp. status   CV/Hemodynamics: Uneventful            Sign Out: Acceptable CV status   Other NRE:    DID A NON-ROUTINE EVENT OCCUR?          Last vitals:  Vitals:    01/29/21 1315 01/29/21 1330 01/29/21 1345   BP: 127/86 126/83 125/82   Pulse: 103 102 95   Resp: 16 13 9   Temp:  36.9  C (98.5  F)    SpO2: 99% 97% 97%       Electronically Signed By: Ovidio Dailey MD  January 29, 2021  2:22 PM

## 2021-01-29 NOTE — DISCHARGE INSTRUCTIONS
[facility]  Same-Day Surgery   Adult Discharge Orders & Instructions     For 24 hours after surgery    1. Get plenty of rest.  A responsible adult must stay with you for at least 24 hours after you leave the hospital.   2. Do not drive or use heavy equipment.  If you have weakness or tingling, don't drive or use heavy equipment until this feeling goes away.  3. Do not drink alcohol.  4. Avoid strenuous or risky activities.  Ask for help when climbing stairs.   5. You may feel lightheaded.  IF so, sit for a few minutes before standing.  Have someone help you get up.   6. If you have nausea (feel sick to your stomach): Drink only clear liquids such as apple juice, ginger ale, broth or 7-Up.  Rest may also help.  Be sure to drink enough fluids.  Move to a regular diet as you feel able.  7. You may have a slight fever. Call the doctor if your fever is over 100 F (37.7 C) (taken under the tongue) or lasts longer than 24 hours.  8. You may have a dry mouth, a sore throat, muscle aches or trouble sleeping.  These should go away after 24 hours.  9. Do not make important or legal decisions.   Call your doctor for any of the followin.  Signs of infection (fever, growing tenderness at the surgery site, a large amount of drainage or bleeding, severe pain, foul-smelling drainage, redness, swelling).    2. It has been over 8 to 10 hours since surgery and you are still not able to urinate (pass water).    3.  Headache for over 24 hours.    To contact Dr Daley's clinic call [phone] or:        [phone] and ask for the resident on call for Gynecology (answered 24 hours a day)      Emergency Department:  [facility]: [phone]

## 2021-01-29 NOTE — BRIEF OP NOTE
[facility]     Brief Operative Note    Pre-operative diagnosis: DEVIN III (vulvar intraepithelial neoplasia III)      HPV 18 positive pap smear  Post-operative diagnosis Same as pre-operative diagnosis    Procedure: Procedure(s):  Colposcopy, Wide local excision of vulva  Surgeon: Surgeon(s) and Role:     * Edilma Daley MD - Primary     * Bhavani Watson MD - Resident - Assisting  Anesthesia: General   Estimated blood loss: Less than 10 ml  Drains: None  Specimens:   ID Type Source Tests Collected by Time Destination   A : CERVICAL BIOPSY AT FOUR O'CLOCK Biopsy Cervix SURGICAL PATHOLOGY EXAM Edilma Daley MD 1/29/2021 11:24 AM    B : ENDOCERVICAL CURETTINGS Tissue Cervix SURGICAL PATHOLOGY EXAM Edilma Daley MD 1/29/2021 11:29 AM    C : VULVAR PERINEUM STITCH  AT LEFT APEX Tissue Vulva SURGICAL PATHOLOGY EXAM Edilma Daley MD 1/29/2021 11:48 AM      Findings: On colposcopy, cervix grossly appears normal. With application of acetic acid small area of acetowhite changes and abnormal appearing vessels noted a the 4 o'clock position. Biopsy obtained. Unable to visualize transformation zone and thus an ECC was performed. On vulvar colposcopy, area of acetowhite changes along the posterior fourchette and perineum, L>R. Hemostasis noted at the end of the case  Complications: None.  Implants: * No implants in log *    Bhavani Watson MD PGY3

## 2021-01-29 NOTE — PROGRESS NOTES
0556 Dr Walter coronado.  Patient benefited from narcs during surgery. She has a two hour drive home. Can she get a discharge script for oxy. Awaiting return call.

## 2021-01-29 NOTE — ANESTHESIA PREPROCEDURE EVALUATION
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]   Procedure : Procedure(s):  Colposcopy, Wide local excision of vulva     Past Medical History:   Diagnosis Date     Acquired hypothyroidism      Anxiety      Bilateral occipital neuralgia      Disorder of thyroid       Past Surgical History:   Procedure Laterality Date     BREAST BIOPSY, CORE RT/LT        Allergies   Allergen Reactions     Azithromycin Hives      Social History     Tobacco Use     Smoking status: Never Smoker     Smokeless tobacco: Never Used   Substance Use Topics     Alcohol use: Yes     Frequency: 2-4 times a month     Comment: once a week      Wt Readings from Last 1 Encounters:   21 68.7 kg (151 lb 7.3 oz)        Anesthesia Evaluation   Pt has not had prior anesthetic         ROS/MED HX  ENT/Pulmonary:       Neurologic:       Cardiovascular:       METS/Exercise Tolerance: >4 METS    Hematologic:       Musculoskeletal:       GI/Hepatic:       Renal/Genitourinary:       Endo:     (+) thyroid problem, hypothyroidism,     Psychiatric/Substance Use:       Infectious Disease:       Malignancy:       Other:            Physical Exam    Airway        Mallampati: II   TM distance: > 3 FB   Neck ROM: full   Mouth opening: > 3 cm    Respiratory Devices and Support         Dental  no notable dental history         Cardiovascular          Rhythm and rate: regular and tachycardia     Pulmonary           breath sounds clear to auscultation           OUTSIDE LABS:  CBC:   Lab Results   Component Value Date    HGB 15.4 2021     BMP: No results found for: NA, POTASSIUM, CHLORIDE, CO2, BUN, CR, GLC  COAGS: No results found for: PTT, INR, FIBR  POC:   Lab Results   Component Value Date    BGM 81 2021    HCG Negative 2021     HEPATIC: No results found for: ALBUMIN, PROTTOTAL, ALT, AST, GGT, ALKPHOS, BILITOTAL,  BILIDIRECT, DARNELL  OTHER: No results found for: PH, LACT, A1C, JOSE, PHOS, MAG, LIPASE, AMYLASE, TSH, T4, T3, CRP, SED    Anesthesia Plan     History & Physical Review      ASA Status:  2. NPO Status:  NPO Appropriate. .  Plan for General with Intravenous induction. Device: ETT Maintenance will be Balanced.     Additional equipment: 2nd IV.    PONV prophylaxis:  Ondansetron (or other 5HT-3) and Dexamethasone or Solumedrol.    Comments: Patient takes oral naltrexone for her thyroid disease. Option of spinal vs GA with multinodal analgesia discussed with patient. She decided to go ahead with GA with multimodal analgesia..       Consents  Anesthesia Plan(s) and associated risks, benefits, and realistic alternatives discussed.    Questions answered and patient/representative(s) expressed understanding.    Discussed with:  Patient.       Extended Intubation/Ventilatory Support Discussed No No     Use of blood products discussed: No.          Postoperative Care  Postoperative pain management: IV analgesics and Oral pain medications.           Ovidio Dailey MD

## 2021-01-29 NOTE — ANESTHESIA PROCEDURE NOTES
Airway   Date/Time: 1/29/2021 11:03 AM   Patient location during procedure: OR  Staff -   Anesthesiologist:  Ovidio Dailey MD  CRNA: Giovana Tamayo APRN CRNA  Other Anesthesia Staff: Gabriel Treadwell APRN CRNA  Performed By: CRNA    Consent for Airway   Urgency: elective    Indications and Patient Condition  Indications for airway management: figueroa-procedural  Induction type:intravenousMask difficulty assessment: 1 - vent by mask    Final Airway Details  Final airway type: endotracheal airway  Successful airway:ETT - single  Endotracheal Airway Details   ETT size (mm): 7.0  Cuffed: yes  Successful intubation technique: direct laryngoscopy  Grade View of Cords: 1  Adjucts: stylet  Measured from: lips  Secured at (cm): 22  Secured with: pink tape  Bite block used: None    Post intubation assessment   Placement verified by: capnometry, equal breath sounds and chest rise   Number of attempts at approach: 1  Secured with:pink tape  Ease of procedure: easy  Dentition: Intact

## 2021-02-02 ENCOUNTER — PATIENT OUTREACH (OUTPATIENT)
Dept: ONCOLOGY | Facility: CLINIC | Age: 46
End: 2021-02-02

## 2021-02-02 ENCOUNTER — DOCUMENTATION ONLY (OUTPATIENT)
Dept: ONCOLOGY | Facility: CLINIC | Age: 46
End: 2021-02-02

## 2021-02-02 NOTE — PROGRESS NOTES
RN reached out to patient for post hospital call.     Patient overall feeling well but had some general questions in regards to pain and headache.     Patient woke up from surgery with a headache and this did not improve over the weekend. Patient took Tylenol and Ibuprofen as instructed but this did not touch the headache. Patient eventually took migraine medication and headache improved and did go away.     Patient stopped taking her Robaxin as she was told no other pain medication could be prescribed if she was on this. Patient not requesting anything at this time but wanted us aware. Surgical pain controlled via tylenol and ibuprofen at this time.    Patient denies any signs and symptoms of concern. Normal healing process and what to expect reviewed.     All patients questions and concerns answered to the best of her ability.

## 2021-02-02 NOTE — CONFIDENTIAL NOTE
Patient stated that she did not have a great surgical experience nor did she have a good experience when calling triage yesterday.     RN used active listening and apologized.     Patient appreciative of the RN time.    Bhavani Hayward RN

## 2021-02-04 LAB — COPATH REPORT: NORMAL

## 2021-02-17 NOTE — PROGRESS NOTES
GYNECOLOGIC  ONCOLOGY CLINIC NOTE      RE: Sushila Nicholson  : 1975  AMELIE: 2021      CC: High grade vulva dysplasia    HPI: Ms Sushila Nicholson is a 45 year old  female who presents for follow up regarding surgical treatment of DEVIN III.     Since surgery Sushila reports some vulva discomfort. No abnormal discharge. She feels the discomfort when sitting for longer periods      Treatment History:    Patient reports recent symptoms of vulva irritation sine . Previous attempt at treatment with estradiol, steroid and antifungal cream did not resolve the issue.   LMP in 2020. She was scheduled to undergo a vaginal pelvic US but could not tolerate this due to the vulva lesions, she prefers to follow up on this issue separately with her local OB/GYN    20 Lesion along perianal area (midline and along right)  Biopsy- high grade squamous intraepithelial lesion  HSV Type 1/2 negative    2021 Pap- NILM, HPV 18 +    2021 Surgery: 1. Exam under anesthesia, Colposcopy with cervical biopsy and endocervical curettage, Vulvar colposcopy, Wide local excision of vulva  Pathology- FINAL DIAGNOSIS:   A. Unremarkable Benign cervical epithelium; negative for dysplasia     B. ENDOCERVIX, CURETTAGE:   - Low grade squamous intraepithelial lesion (cervical intraepithelial   neoplasia 1)     C. 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)   - Margins are negative for high grade dysplasia       OBGYN history and Health Maintenance:  G0  Last Pap Smear: 2021 Pap- NILM, HPV 18 +  HPV Vaccine in her 20's      Review of Systems:  See patient completed ROS, reviewed    Past Medical History:   Diagnosis Date     Acquired hypothyroidism      Anxiety      Bilateral occipital neuralgia      Disorder of thyroid        Past Surgical History:   Procedure Laterality Date     BREAST BIOPSY, CORE RT/LT       EXCISE VULVA WIDE LOCAL  Bilateral 1/29/2021    Procedure: Colposcopy, Wide local excision of vulva, CERVICAL BIOPSY, ENDOMETRIAL CURRETTAGE,;  Surgeon: Edilma Daley MD;  Location:  OR          Current Outpatient Medications   Medication Sig Dispense Refill     acetaminophen (TYLENOL) 325 MG tablet Take 2 tablets (650 mg) by mouth every 4 hours as needed for mild pain 50 tablet 0     azelastine (ASTELIN) 0.1 % nasal spray Spray 1 spray in nostril       DHEA 25 MG CAPS Take 5 mg by mouth       Drospiren-Eth Estrad-Levomefol 3-0.02-0.451 MG TABS Take 1 tablet by mouth       fish oil-omega-3 fatty acids 1000 MG capsule Take 1 capsule by mouth       fluconazole (DIFLUCAN) 150 MG tablet        ibuprofen (ADVIL/MOTRIN) 600 MG tablet Take 1 tablet (600 mg) by mouth every 6 hours as needed for other (mild and/or inflammatory pain) 30 tablet 0     lidocaine (XYLOCAINE) 2 % external gel AS DIRECTED FOR 1 DOSE       methocarbamol (ROBAXIN) 750 MG tablet Take 750 mg by mouth       multivitamin w/minerals (MULTI-VITAMIN) tablet Take 1 tablet by mouth daily       naltrexone (DEPADE/REVIA) 50 MG tablet Take 0.5 mg by mouth daily       naltrexone (REVIA) 1 mg/mL SOLN Take by mouth once 0.5 AM 4.5 PM       Probiotic Product (PROBIOTIC-10 PO)        spironolactone (ALDACTONE) 100 MG tablet Take 100 mg by mouth       SUMAtriptan (IMITREX) 50 MG tablet TAKE 1 TABLET(50 MG) BY MOUTH AT ONSET OF HEADACHE AS DIRECTED. MAY REPEAT IN 2 HOURS AS NEEDED       SYNTHROID 100 MCG tablet TAKE 1 TABLET BY MOUTH ONCE DAILY IN THE MORNING ON AN EMPTY STOMACH       valACYclovir (VALTREX) 1000 mg tablet Take 1,000 mg by mouth daily       vitamin D3 (CHOLECALCIFEROL) 1.25 MG (08921 UT) capsule        zinc gluconate 50 MG tablet Take 50 mg by mouth daily           Allergies   Allergen Reactions     Azithromycin Hives       Social History:  Social History     Tobacco Use     Smoking status: Never Smoker     Smokeless tobacco: Never Used   Substance Use Topics     Alcohol  "use: Yes     Frequency: 2-4 times a month     Comment: once a week         Family History:   The patient's family history is notable for no ovarian, breast or colon cancer  No family history on file.      Physical Exam:     /79 (BP Location: Right arm, Patient Position: Sitting, Cuff Size: Adult Regular)   Pulse 106   Temp 98  F (36.7  C) (Oral)   Resp 16   Ht 1.6 m (5' 2.99\")   Wt 70.3 kg (154 lb 14.4 oz)   SpO2 96%   BMI 27.45 kg/m    Body mass index is 27.45 kg/m .    General: Alert and oriented, no acute distress  Psych: Mood stable  GI: No distention. No masses. No hernia.   Lymph: No enlarge lymph nodes in neck or groin  : Normal external genitalia. Vulva with incision intact, no erythema.      Assessment: Sushila Nicholson is a 45 year old woman s/p wide local excision for high grade vulva dysplasia.    Pathology reviewed and discussed, margins negative.    Cervical cancer surveillance- diagnosis of MECHE I post screening result of NILM/ HPV 18+      Plan:     1.)   Vulva Dysplasia risk of multifocal disease, recommend continued examination. Follow up in 6 months. Sushila is aware that recurrent symptoms of vulvar pain, itching or discomfort should be examined.     2.)   Cervical MECHE 1- follow up in 1 year for co-teting    3.)    Patient will follow up for the Pelvic US with her Gyn team           Edilma Daley M.D., MPH,  F.A.C.O.G.  Professor  Department of Ob/Gyn and Women's Health  Division of Gynecologic Oncology  [facility]/[employer] [place]  [phone]          "

## 2021-02-18 ENCOUNTER — OFFICE VISIT (OUTPATIENT)
Dept: ONCOLOGY | Facility: CLINIC | Age: 46
End: 2021-02-18
Attending: OBSTETRICS & GYNECOLOGY
Payer: COMMERCIAL

## 2021-02-18 VITALS
RESPIRATION RATE: 16 BRPM | DIASTOLIC BLOOD PRESSURE: 79 MMHG | HEIGHT: 63 IN | TEMPERATURE: 98 F | HEART RATE: 106 BPM | SYSTOLIC BLOOD PRESSURE: 124 MMHG | BODY MASS INDEX: 27.45 KG/M2 | OXYGEN SATURATION: 96 % | WEIGHT: 154.9 LBS

## 2021-02-18 DIAGNOSIS — D07.1 VIN III (VULVAR INTRAEPITHELIAL NEOPLASIA III): Primary | ICD-10-CM

## 2021-02-18 PROCEDURE — 99024 POSTOP FOLLOW-UP VISIT: CPT | Performed by: OBSTETRICS & GYNECOLOGY

## 2021-02-18 ASSESSMENT — PAIN SCALES - GENERAL: PAINLEVEL: MILD PAIN (2)

## 2021-02-18 ASSESSMENT — MIFFLIN-ST. JEOR: SCORE: 1321.62

## 2021-02-18 NOTE — LETTER
2021         RE: Sushila Nicholson  [address] [address]        Dear Colleague,    Thank you for referring your patient, Sushila Nicholson, to the [facility]. Please see a copy of my visit note below.    GYNECOLOGIC  ONCOLOGY CLINIC NOTE      RE: Sushila Nicholson  : 1975  AMELIE: 2021      CC: High grade vulva dysplasia    HPI: Ms Sushila Nicholosn is a 45 year old  female who presents for follow up regarding surgical treatment of DEVIN III.     Since surgery Sushila reports some vulva discomfort. No abnormal discharge. She feels the discomfort when sitting for longer periods      Treatment History:    Patient reports recent symptoms of vulva irritation sine . Previous attempt at treatment with estradiol, steroid and antifungal cream did not resolve the issue.   LMP in 2020. She was scheduled to undergo a vaginal pelvic US but could not tolerate this due to the vulva lesions, she prefers to follow up on this issue separately with her local OB/GYN    20 Lesion along perianal area (midline and along right)  Biopsy- high grade squamous intraepithelial lesion  HSV Type 1/2 negative    2021 Pap- NILM, HPV 18 +    2021 Surgery: 1. Exam under anesthesia, Colposcopy with cervical biopsy and endocervical curettage, Vulvar colposcopy, Wide local excision of vulva  Pathology- FINAL DIAGNOSIS:   A. Unremarkable Benign cervical epithelium; negative for dysplasia     B. ENDOCERVIX, CURETTAGE:   - Low grade squamous intraepithelial lesion (cervical intraepithelial   neoplasia 1)     C. 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)   - Margins are negative for high grade dysplasia       OBGYN history and Health Maintenance:  G0  Last Pap Smear: 2021 Pap- NILM, HPV 18 +  HPV Vaccine in her 's      Review of Systems:  See patient completed ROS, reviewed    Past  Medical History:   Diagnosis Date     Acquired hypothyroidism      Anxiety      Bilateral occipital neuralgia      Disorder of thyroid        Past Surgical History:   Procedure Laterality Date     BREAST BIOPSY, CORE RT/LT       EXCISE VULVA WIDE LOCAL Bilateral 1/29/2021    Procedure: Colposcopy, Wide local excision of vulva, CERVICAL BIOPSY, ENDOMETRIAL CURRETTAGE,;  Surgeon: Edilma Daley MD;  Location: [facility]          Current Outpatient Medications   Medication Sig Dispense Refill     acetaminophen (TYLENOL) 325 MG tablet Take 2 tablets (650 mg) by mouth every 4 hours as needed for mild pain 50 tablet 0     azelastine (ASTELIN) 0.1 % nasal spray Spray 1 spray in nostril       DHEA 25 MG CAPS Take 5 mg by mouth       Drospiren-Eth Estrad-Levomefol 3-0.02-0.451 MG TABS Take 1 tablet by mouth       fish oil-omega-3 fatty acids 1000 MG capsule Take 1 capsule by mouth       fluconazole (DIFLUCAN) 150 MG tablet        ibuprofen (ADVIL/MOTRIN) 600 MG tablet Take 1 tablet (600 mg) by mouth every 6 hours as needed for other (mild and/or inflammatory pain) 30 tablet 0     lidocaine (XYLOCAINE) 2 % external gel AS DIRECTED FOR 1 DOSE       methocarbamol (ROBAXIN) 750 MG tablet Take 750 mg by mouth       multivitamin w/minerals (MULTI-VITAMIN) tablet Take 1 tablet by mouth daily       naltrexone (DEPADE/REVIA) 50 MG tablet Take 0.5 mg by mouth daily       naltrexone (REVIA) 1 mg/mL SOLN Take by mouth once 0.5 AM 4.5 PM       Probiotic Product (PROBIOTIC-10 PO)        spironolactone (ALDACTONE) 100 MG tablet Take 100 mg by mouth       SUMAtriptan (IMITREX) 50 MG tablet TAKE 1 TABLET(50 MG) BY MOUTH AT ONSET OF HEADACHE AS DIRECTED. MAY REPEAT IN 2 HOURS AS NEEDED       SYNTHROID 100 MCG tablet TAKE 1 TABLET BY MOUTH ONCE DAILY IN THE MORNING ON AN EMPTY STOMACH       valACYclovir (VALTREX) 1000 mg tablet Take 1,000 mg by mouth daily       vitamin D3 (CHOLECALCIFEROL) 1.25 MG (16306 UT) capsule        zinc gluconate 50 MG  "tablet Take 50 mg by mouth daily           Allergies   Allergen Reactions     Azithromycin Hives       Social History:  Social History     Tobacco Use     Smoking status: Never Smoker     Smokeless tobacco: Never Used   Substance Use Topics     Alcohol use: Yes     Frequency: 2-4 times a month     Comment: once a week         Family History:   The patient's family history is notable for no ovarian, breast or colon cancer  No family history on file.      Physical Exam:     /79 (BP Location: Right arm, Patient Position: Sitting, Cuff Size: Adult Regular)   Pulse 106   Temp 98  F (36.7  C) (Oral)   Resp 16   Ht 1.6 m (5' 2.99\")   Wt 70.3 kg (154 lb 14.4 oz)   SpO2 96%   BMI 27.45 kg/m    Body mass index is 27.45 kg/m .    General: Alert and oriented, no acute distress  Psych: Mood stable  GI: No distention. No masses. No hernia.   Lymph: No enlarge lymph nodes in neck or groin  : Normal external genitalia. Vulva with incision intact, no erythema.      Assessment: Sushila Nicholson is a 45 year old woman s/p wide local excision for high grade vulva dysplasia.    Pathology reviewed and discussed, margins negative.    Cervical cancer surveillance- diagnosis of MECHE I post screening result of NILM/ HPV 18+      Plan:     1.)   Vulva Dysplasia risk of multifocal disease, recommend continued examination. Follow up in 6 months. Sushila is aware that recurrent symptoms of vulvar pain, itching or discomfort should be examined.     2.)   Cervical MECHE 1- follow up in 1 year for co-teting    3.)    Patient will follow up for the Pelvic US with her Gyn team         Edilma Daley M.D., MPH,  F.A.C.O.G.  Professor  Department of Ob/Gyn and Women's Health  Division of Gynecologic Oncology  [facility] [place]  [phone]          "

## 2021-02-18 NOTE — LETTER
To Whom It MayConcern:       Sushila Nicholson  was seen in our clinic on 2/18/2021        Patient may return to work on  3/8/2021 .                            Restrictions: NO restrictions      Comments: Please call with any questions [phone]         Provider Signature:         Edilma Daley MD

## 2021-02-18 NOTE — NURSING NOTE
"Oncology Rooming Note    February 18, 2021 9:30 AM   Sushila Nicholson is a 46 year old female who presents for:    Chief Complaint   Patient presents with     Oncology Clinic Visit     RETURN; DEVIN III     Initial Vitals: /79 (BP Location: Right arm, Patient Position: Sitting, Cuff Size: Adult Regular)   Pulse 106   Temp 98  F (36.7  C) (Oral)   Resp 16   Ht 1.6 m (5' 2.99\")   Wt 70.3 kg (154 lb 14.4 oz)   SpO2 96%   BMI 27.45 kg/m   Estimated body mass index is 27.45 kg/m  as calculated from the following:    Height as of this encounter: 1.6 m (5' 2.99\").    Weight as of this encounter: 70.3 kg (154 lb 14.4 oz). Body surface area is 1.77 meters squared.  Mild Pain (2) Comment: Data Unavailable   No LMP recorded. Patient is perimenopausal.  Allergies reviewed: Yes  Medications reviewed: Yes    Medications: Medication refills not needed today.  Pharmacy name entered into [facility]:    [id number] - [place], [place] - [address] AT [facility] [address]  [id number] - [place], [place] - [address]    Clinical concerns: Patient would like to discuss removing stitches.        Nicole Aviles CMA              "

## 2021-03-07 ENCOUNTER — HEALTH MAINTENANCE LETTER (OUTPATIENT)
Age: 46
End: 2021-03-07

## 2021-03-09 ENCOUNTER — PATIENT OUTREACH (OUTPATIENT)
Dept: ONCOLOGY | Facility: CLINIC | Age: 46
End: 2021-03-09

## 2021-03-09 PROBLEM — Z98.890 POSTOPERATIVE STATE: Status: ACTIVE | Noted: 2021-03-09

## 2021-03-09 NOTE — PROGRESS NOTES
Patient reached out to RN with concerns about her vulva incision and vaginal itching. Patient has taken two doses of Diflucan with no relief.     RN and patient discuss normal healing after a wide local excision.     RN reccommended patient be seen either locally or with one of our NP's to assure normal healing process.     Patient will see local OBGYN today but also would like an appointment with our NP tomorrow just in case her symptoms are tied to something being wrong due to surgery.     Patient will cancel [place] appointment if not needed.     Bhavani Hayward RN

## 2021-03-12 ENCOUNTER — TELEPHONE (OUTPATIENT)
Dept: ONCOLOGY | Facility: CLINIC | Age: 46
End: 2021-03-12

## 2021-03-12 NOTE — TELEPHONE ENCOUNTER
Spoke to Sushila about her current symptoms including this on going itching which is located right inside the vagina - out side the vagina around the opening and above the clitoris.  Patient states it feels like a horrible yeast infection but she has been examined by her local ObGyn and ruled out for Yeast, Trich, bacteria, gonorrhea, etc.  Patient incisions from the WLE is lower and to the left so it is not bothering her at all, just this non stopping itching.  Patient is not wearing any tight clothing and while at home going with out underwear.  So doing every thing that has been suggested but still suffering to the point that it is waking her at night.  I will reach out to Dr. Daley to see if we have anything that might help her suffering.

## 2021-03-15 NOTE — TELEPHONE ENCOUNTER
Spoke to Sushila- let her know I was able to review her symptoms with a partner of Dr. Daley's and the recommendation is for her to be seen in clinic with one of our MDs and possibly get a biopsy of the area.  I have set the patient up to see Dr. FELICIA Rubi on 3/17 @ 1 pm.

## 2021-03-16 NOTE — PROGRESS NOTES
Patient reached out to RN with OBGYN update.     No yeast infection was found and per OBGYN the incision looked good with no healing concerns.     Patient wanted explaination for itching if there was no yeast and incision looked good.     RN and patient discussed normal healing process and discussed how when incisions heal they sometimes become itchy. However, sometimes there are things such as a stitch that can be irritating things and or other things that may be causing the irritation/itching.     Patient wanted to know if the itching was 100% normal and RN could not give 100% reassurance that was the case. RN stated that the only way to know this would be to have our Gyn/Onc NP do an evaluation. RN recommended she keep the appointment with Britta Feliciano in [place].     Patient was unclear why RN would have had her see her OBGYN. It was reviewed that we had no MD's in clinic and NP schedule was full. OBGYN can evaluate and treat if there was something such as yeast or clear infection.     Patient again asked about options for itching and these were reviewed.     Patient encouraged to keep appt with Gyn/Onc if the itching was still there the next day.     Patient asked how to contact both clinics and asked if there was a way to bypass RN.     Call back numbers for scheduling for both [place] and [facility] given.     Bhavani Hayward RN

## 2021-03-17 ENCOUNTER — ONCOLOGY VISIT (OUTPATIENT)
Dept: ONCOLOGY | Facility: CLINIC | Age: 46
End: 2021-03-17
Attending: OBSTETRICS & GYNECOLOGY
Payer: COMMERCIAL

## 2021-03-17 VITALS — BODY MASS INDEX: 27.16 KG/M2 | WEIGHT: 153.3 LBS | RESPIRATION RATE: 18 BRPM | HEIGHT: 63 IN | OXYGEN SATURATION: 99 %

## 2021-03-17 DIAGNOSIS — L28.0 LICHENIFICATION AND LICHEN SIMPLEX CHRONICUS: Primary | ICD-10-CM

## 2021-03-17 DIAGNOSIS — L30.9 VULVAR DERMATITIS: ICD-10-CM

## 2021-03-17 PROCEDURE — 99215 OFFICE O/P EST HI 40 MIN: CPT | Performed by: OBSTETRICS & GYNECOLOGY

## 2021-03-17 PROCEDURE — 999N000102 HC STATISTIC NO CHARGE CLINIC VISIT

## 2021-03-17 RX ORDER — TRIAMCINOLONE ACETONIDE 1 MG/G
OINTMENT TOPICAL
Qty: 30 G | Refills: 3 | Status: SHIPPED | OUTPATIENT
Start: 2021-03-17 | End: 2023-03-17

## 2021-03-17 RX ORDER — NYSTATIN 100000 U/G
OINTMENT TOPICAL
Qty: 30 G | Refills: 1 | Status: SHIPPED | OUTPATIENT
Start: 2021-03-17 | End: 2023-03-17

## 2021-03-17 RX ORDER — ZINC OXIDE 20 %
OINTMENT (GRAM) TOPICAL
Qty: 60 G | Refills: 3 | Status: SHIPPED | OUTPATIENT
Start: 2021-03-17 | End: 2023-03-17

## 2021-03-17 ASSESSMENT — MIFFLIN-ST. JEOR: SCORE: 1314.33

## 2021-03-17 NOTE — LETTER
"    3/17/2021         RE: Sushila Nicholson  [address] [facility] [address]        Dear Colleague,    Thank you for referring your patient, Sushila Nicholson, to the [facility]. Please see a copy of my visit note below.    Gynecologic Oncology Clinic - Established Patient Visit    Visit date: Mar 17, 2021     CC: vulvar pruritis    Interval history: Sushila Nicholson is a 46 year old pre-menopausal patient who is s/p WLE 1/29/2021 for DEVIN III who presents with severe vulvar pruritis and burning. This is located in the mid to upper vulva and not in the area of prior excision. She has tried topical compounded hydrocortisone-lidocaine-fluconazole ointment without relief. She has also tried a topical anti-itch/analgesic ointment which provides some relief temporarily. She is using benadryl at night. The itching is severe enough to interrupt sleep.She has used baking soda soaks, cool compresses, and ice packs. She used Diflucan which provided a few days of relief.    Of note, she has been amenorrheic for the last several months. Prior to this she had light menses. She has not yet had an ultrasound.    Review of Systems:  Constitutional: no fevers, chills  : as per above  Endo: +thyroid dysfunction, hot flashes    Past Medical History:  Past Medical History:   Diagnosis Date     Acquired hypothyroidism      Anxiety      Bilateral occipital neuralgia      Disorder of thyroid        Past Surgical History:  Past Surgical History:   Procedure Laterality Date     BREAST BIOPSY, CORE RT/LT       EXCISE VULVA WIDE LOCAL Bilateral 1/29/2021    Procedure: Colposcopy, Wide local excision of vulva, CERVICAL BIOPSY, ENDOMETRIAL CURRETTAGE,;  Surgeon: Edilma Daley MD;  Location: [facility] OR        Physical Exam:  Resp 18   Ht 1.6 m (5' 2.99\")   Wt 69.5 kg (153 lb 4.8 oz)   SpO2 99%   BMI 27.16 kg/m     General appearance: no acute distress, well groomed, sitting comfortably   :  External: bilateral " labia major erythematous to about mid-labia, there is lichenification of the bilateral labia, no excoriations, normal architecture, the labia minora, clitoral gonzales, and figueroa-vaginal tissue is erythematous and edematous. Prior WLE site on the left vulva is well healed.    Labs/Pathology:  Reviewed STI testing from local provider    Imaging review:  na    Assessment:  Sushila is a 46 year old pre-menopausal female with recent WLE for DEVIN III now with evidence of vulvar lichen simplex chronicus secondary.    Plan:  - We extensively reviewed the diagnosis, which is usually precipitated by some type of topical irritant/contact dermatitis which then precipitates and itch-scratch cycle, which results in the inflammation and lichenification (thickening of skin) seen on exam. This becomes involuntary with patients often scratching the area during sleep. Oftentimes the offending/inciting agent cannot be identified. The loss of skin barrier can predispose to vulvar yeast infection which serves to exacerbate the cycle.   - Treatment revolves around removing any and all possible irritants. This is outlined below the signature block in Vulvar Skin Care Guidelines.   - In addition, we will do a steroid/anti-fungal taper to try to calm the irritation that exists. She can continue to take antihistamines and use cold compresses and sitz baths as needed.   - Mycolog (nystatin/triamcinolone) applied 2x daily x 2 weeks, then daily x 2 weeks, then 2-3x/week until symptoms resolve. Then as needed. During all other times of day, she should use a skin protectant to the area: zinc oxide (not diaper cream it should be zinc with only petroleum or mineral oil), Vaseline, or olive oil/coconut oil/vegetable oil. Keep the area otherwise as dry as possible.   - We will have a virtual visit in 2 weeks to review how her symptoms are and assess need for any further work-up or monitoring.   - Amenorrhea: recommend pelvic transvaginal ultrasound to assess  endometrial lining, but we will delay this until her vulvar dermatitis is under control to prevent further irritation. Could be related to thyroid dysfunction. Pending ultrasound results she may need FSH/LH testing or progesterone withdrawal. We will readdress after treatment of above to ensure she has appropriate follow-up.    I spent a total of 40 minutes with Sushila Nicholson during today's visit. >50% of that was spent in discussion of her condition and treatment recommendations as documented in this note. In addition I spent 2- minutes documenting this visit and reviewing her outside records related to her vulvar assessments and treatments.     Ciro Mcclellan MD     Gynecologic Oncology         VULVAR SKIN CARE GUIDELINES    NOTE:  The goal is to promote healthy vulvar skin.  This is done by decreasing and removing chemicals, moisture, or rubbing (friction).  Products listed below have been suggested for use because of their past success in helping to decrease or relieve vulvar/vaginal burning, irritation, or itching. The list is not all inclusive and is not meant to promote one product over another.     LAUNDRY PRODUCTS  1. Use the detergent brand ALL FREE CLEAR on all laundry that goes into your washer, every load, and every time.  NO SUBSTITUTIONS.  Use 1/3 to 1/2 the suggested amount per load. For high efficiency machines, use a very small amount of detergent and consider using an extra rinse cycle on undergarments to remove as much detergent as possible.    2. Do not use fabric softeners or dryer sheets in the washer or dryer, even those advertised as  free .  If you use a shared washer or dryer, such as a Laundromat, apartment, or dorm you must hand wash, in ALL FREE CLEAR and line dry your underwear.  You can use dryer balls to help soften clothes.    3. Stain removing products (including bleach).  Soak and rinse in clear water all underwear and towels on which you have used a stain  "removing product.  Then wash in your regular washing cycle using ALL FREE CLEAR. This removes as much of the product as possible.  White vinegar or lemon juice, 1/4 to 1/3 cup per laundry load, can be used to refresh clothing and remove oils.    CLOTHING  1. Wear white all cotton underwear, not nylon with a cotton crotch.  Cotton allows air in and moisture out.  Do not wear underwear when sleeping at night.  Do not wear thongs.  Loose fitting cotton boxers or cotton pajama bottoms are fine.    2. Avoid pantyhose.  If you must wear them, either cut out the brittaney crotch (if you cut out the crotch be sure to leave about 1/4 inch of fabric from the seam to prevent running) or wear thigh high hose.  Many stores now carry thigh high hose.    3. Avoid tight clothing, especially clothing made of synthetic fabrics.  Remove wet bathing and exercise clothing as soon as you can.    BATHING AND HYGIENE  1. Do not use bath soaps, lotions, gels, etc. which contain perfumes.  These may smell nice, but can be irritating.  This includes many baby products and feminine hygiene products marked \"gentle\" or \"mild\".  DOVE FOR SENSITIVE SKIN, NEUTROGENA, BASIS, AVEENO, OR PEARS are the soaps we suggest.  Your partner needs to use one of these soaps also.  Do not use soap directly on the vulvar skin.  Just warm water and your hand will keep the vulvar area clean without irritating the skin.    2. Do not use bubble bath, bath salts, and scented oils.  You may apply a neutral (unscented, non-perfumed) oil or lotion to damp skin after getting out of the tub or shower.  Do not apply lotion directly to the vulva.    3. Do not scrub vulvar skin with a washcloth, washing with your hand and warm water is enough for good cleaning.    4. Pat dry rather than rubbing with a towel.  Or, use a hair dryer on a cool setting to dry the vulva.    5. Baking Soda soaks.  Soak in lukewarm (not hot) bath water with 4-5 tablespoons of baking soda to help soothe " vulvar itching and burning.  Soak 1 to 3 times a day for 10 minutes.  If you are using a sitz bath, use 1 to 2 teaspoons of baking soda.    6. Use white, unscented toilet paper.  Do not use toilet paper with aloe.    7. Do not use feminine hygiene sprays, perfumes, adult, or baby wipes.  You can use Tucks hemorrhoid pads.  If urine causes burning of the skin, pour lukewarm water over the vulva while urinating.  Pat dry rather than wiping.    8. Do not use deodorized pads and tampons.  Tampons may be used when the blood flow is heavy enough to soak one tampon in four hours or less.  Tampons are safe for most women, but wearing them too long or when the blood flow is light may result in vaginal infection, increased discharge, odor, or toxic shock syndrome.  Also, use only pads that have a cotton liner, not nylon mesh weave, that comes in contact with your skin.  Nylon traps moisture and keeps blood and discharge against your skin longer.  We recommend STAYFREE, CAREFREE, or 7th GENERATION.    9. Do not use over-the-counter creams or ointments until you ask your health care provider.  When buying ointments, be sure that they are parable and fragrance-free.    10. Small amounts of extra virgin olive oil, vegetable oil, zinc oxide ointment, or plain Vaseline may be applied to your vulva as often as needed to protect the skin.  It also helps to decrease skin irritation during your period and when you urinate.    11. Do not douche.  Baking soda soaks or rinsing with warm water will help rinse away extra discharge and help with odor.    12. Do not shave or use hair removal products on the vulvar area.  You may use scissors to trim the pubic hair close to the vulva.  Laser hair removal is an option.    13. Some women may have problems with chronic dampness.  Keeping dry is important; however, if at all possible, do not wear pads on a daily basis. Choose cotton fabrics whenever you can.  Keep an extra pair of underwear with  you and change if you become damp. GOLD BOND or ZEASORB powder may be applied to the vulva and groin area 1 to 2 times per day to help absorb moisture in especially hot times of year; however, do not use this regularly if possible. Do not use powders that contain cornstarch.    14. Dryness and irritation during intercourse may be helped by using a lubricant.  Use a small amount of a pure vegetable oil (solid, liquid, or extra virgin olive oil).  These oils contain no chemicals to irritate vulvar/vaginal skin.  Vegetable oils will rinse away with water and will not increase your chances of infection.  Over-the-counter water-based lubricants tend to dry out before intercourse is over, causing small tears in the vagina, and may also contain chemicals that can irritate your vulvar skin.  It may be helpful to use a non-lubricated, non-spermicidal condom, and use vegetable oil as the lubricant.  This will help keep the semen off the skin which can decrease burning and irritation after intercourse.    BIRTH CONTROL OPTIONS  1. The new low-dose oral birth control pills do not increase your chances of getting a yeast infection.    2. Lubricated condoms, contraceptive jellies, creams, or sponges may cause itching and burning.    3. The use of latex condoms with a vegetable oil as a lubricant (#14 above) is suggested to protect your skin. Petroleum-based lubricants may affect the integrity of condoms when used for birth control or prevention of sexually transmitted infections.  Our experience has not found this to be a problem with vegetable-based oils.  However, the Centers for Disease Control recommend that condoms not be used with any oil-based lubricants for birth control or prevention of sexually transmitted disease.    Again, thank you for allowing me to participate in the care of your patient.      Sincerely,      Ciro Mcclellan MD

## 2021-03-18 NOTE — PROGRESS NOTES
"Gynecologic Oncology Clinic - Established Patient Visit    Visit date: Mar 17, 2021     CC: vulvar pruritis    Interval history: Sushila Nicholson is a 46 year old pre-menopausal patient who is s/p WLE 1/29/2021 for DEVIN III who presents with severe vulvar pruritis and burning. This is located in the mid to upper vulva and not in the area of prior excision. She has tried topical compounded hydrocortisone-lidocaine-fluconazole ointment without relief. She has also tried a topical anti-itch/analgesic ointment which provides some relief temporarily. She is using benadryl at night. The itching is severe enough to interrupt sleep.She has used baking soda soaks, cool compresses, and ice packs. She used Diflucan which provided a few days of relief.    Of note, she has been amenorrheic for the last several months. Prior to this she had light menses. She has not yet had an ultrasound.    Review of Systems:  Constitutional: no fevers, chills  : as per above  Endo: +thyroid dysfunction, hot flashes    Past Medical History:  Past Medical History:   Diagnosis Date     Acquired hypothyroidism      Anxiety      Bilateral occipital neuralgia      Disorder of thyroid        Past Surgical History:  Past Surgical History:   Procedure Laterality Date     BREAST BIOPSY, CORE RT/LT       EXCISE VULVA WIDE LOCAL Bilateral 1/29/2021    Procedure: Colposcopy, Wide local excision of vulva, CERVICAL BIOPSY, ENDOMETRIAL CURRETTAGE,;  Surgeon: Edilma Daley MD;  Location: [facility] OR        Physical Exam:  Resp 18   Ht 1.6 m (5' 2.99\")   Wt 69.5 kg (153 lb 4.8 oz)   SpO2 99%   BMI 27.16 kg/m     General appearance: no acute distress, well groomed, sitting comfortably   :  External: bilateral labia major erythematous to about mid-labia, there is lichenification of the bilateral labia, no excoriations, normal architecture, the labia minora, clitoral gonzales, and figueroa-vaginal tissue is erythematous and edematous. Prior WLE site on the left vulva is " well healed.    Labs/Pathology:  Reviewed STI testing from local provider    Imaging review:  na    Assessment:  Sushila is a 46 year old pre-menopausal female with recent WLE for DEVIN III now with evidence of vulvar lichen simplex chronicus secondary.    Plan:  - We extensively reviewed the diagnosis, which is usually precipitated by some type of topical irritant/contact dermatitis which then precipitates and itch-scratch cycle, which results in the inflammation and lichenification (thickening of skin) seen on exam. This becomes involuntary with patients often scratching the area during sleep. Oftentimes the offending/inciting agent cannot be identified. The loss of skin barrier can predispose to vulvar yeast infection which serves to exacerbate the cycle.   - Treatment revolves around removing any and all possible irritants. This is outlined below the signature block in Vulvar Skin Care Guidelines.   - In addition, we will do a steroid/anti-fungal taper to try to calm the irritation that exists. She can continue to take antihistamines and use cold compresses and sitz baths as needed.   - Mycolog (nystatin/triamcinolone) applied 2x daily x 2 weeks, then daily x 2 weeks, then 2-3x/week until symptoms resolve. Then as needed. During all other times of day, she should use a skin protectant to the area: zinc oxide (not diaper cream it should be zinc with only petroleum or mineral oil), Vaseline, or olive oil/coconut oil/vegetable oil. Keep the area otherwise as dry as possible.   - We will have a virtual visit in 2 weeks to review how her symptoms are and assess need for any further work-up or monitoring.   - Amenorrhea: recommend pelvic transvaginal ultrasound to assess endometrial lining, but we will delay this until her vulvar dermatitis is under control to prevent further irritation. Could be related to thyroid dysfunction. Pending ultrasound results she may need FSH/LH testing or progesterone withdrawal. We will  readdress after treatment of above to ensure she has appropriate follow-up.    I spent a total of 40 minutes with Sushila Nicholson during today's visit. >50% of that was spent in discussion of her condition and treatment recommendations as documented in this note. In addition I spent 2- minutes documenting this visit and reviewing her outside records related to her vulvar assessments and treatments.     Ciro Mcclellan MD     Gynecologic Oncology         VULVAR SKIN CARE GUIDELINES    NOTE:  The goal is to promote healthy vulvar skin.  This is done by decreasing and removing chemicals, moisture, or rubbing (friction).  Products listed below have been suggested for use because of their past success in helping to decrease or relieve vulvar/vaginal burning, irritation, or itching. The list is not all inclusive and is not meant to promote one product over another.     LAUNDRY PRODUCTS  1. Use the detergent brand ALL FREE CLEAR on all laundry that goes into your washer, every load, and every time.  NO SUBSTITUTIONS.  Use 1/3 to 1/2 the suggested amount per load. For high efficiency machines, use a very small amount of detergent and consider using an extra rinse cycle on undergarments to remove as much detergent as possible.    2. Do not use fabric softeners or dryer sheets in the washer or dryer, even those advertised as  free .  If you use a shared washer or dryer, such as a Laundromat, apartment, or dorm you must hand wash, in ALL FREE CLEAR and line dry your underwear.  You can use dryer balls to help soften clothes.    3. Stain removing products (including bleach).  Soak and rinse in clear water all underwear and towels on which you have used a stain removing product.  Then wash in your regular washing cycle using ALL FREE CLEAR. This removes as much of the product as possible.  White vinegar or lemon juice, 1/4 to 1/3 cup per laundry load, can be used to refresh clothing and remove  "oils.    CLOTHING  1. Wear white all cotton underwear, not nylon with a cotton crotch.  Cotton allows air in and moisture out.  Do not wear underwear when sleeping at night.  Do not wear thongs.  Loose fitting cotton boxers or cotton pajama bottoms are fine.    2. Avoid pantyhose.  If you must wear them, either cut out the brittaney crotch (if you cut out the crotch be sure to leave about 1/4 inch of fabric from the seam to prevent running) or wear thigh high hose.  Many stores now carry thigh high hose.    3. Avoid tight clothing, especially clothing made of synthetic fabrics.  Remove wet bathing and exercise clothing as soon as you can.    BATHING AND HYGIENE  1. Do not use bath soaps, lotions, gels, etc. which contain perfumes.  These may smell nice, but can be irritating.  This includes many baby products and feminine hygiene products marked \"gentle\" or \"mild\".  DOVE FOR SENSITIVE SKIN, NEUTROGENA, BASIS, AVEENO, OR PEARS are the soaps we suggest.  Your partner needs to use one of these soaps also.  Do not use soap directly on the vulvar skin.  Just warm water and your hand will keep the vulvar area clean without irritating the skin.    2. Do not use bubble bath, bath salts, and scented oils.  You may apply a neutral (unscented, non-perfumed) oil or lotion to damp skin after getting out of the tub or shower.  Do not apply lotion directly to the vulva.    3. Do not scrub vulvar skin with a washcloth, washing with your hand and warm water is enough for good cleaning.    4. Pat dry rather than rubbing with a towel.  Or, use a hair dryer on a cool setting to dry the vulva.    5. Baking Soda soaks.  Soak in lukewarm (not hot) bath water with 4-5 tablespoons of baking soda to help soothe vulvar itching and burning.  Soak 1 to 3 times a day for 10 minutes.  If you are using a sitz bath, use 1 to 2 teaspoons of baking soda.    6. Use white, unscented toilet paper.  Do not use toilet paper with aloe.    7. Do not use " feminine hygiene sprays, perfumes, adult, or baby wipes.  You can use Tucks hemorrhoid pads.  If urine causes burning of the skin, pour lukewarm water over the vulva while urinating.  Pat dry rather than wiping.    8. Do not use deodorized pads and tampons.  Tampons may be used when the blood flow is heavy enough to soak one tampon in four hours or less.  Tampons are safe for most women, but wearing them too long or when the blood flow is light may result in vaginal infection, increased discharge, odor, or toxic shock syndrome.  Also, use only pads that have a cotton liner, not nylon mesh weave, that comes in contact with your skin.  Nylon traps moisture and keeps blood and discharge against your skin longer.  We recommend STAYFREE, CAREFREE, or 7th GENERATION.    9. Do not use over-the-counter creams or ointments until you ask your health care provider.  When buying ointments, be sure that they are parable and fragrance-free.    10. Small amounts of extra virgin olive oil, vegetable oil, zinc oxide ointment, or plain Vaseline may be applied to your vulva as often as needed to protect the skin.  It also helps to decrease skin irritation during your period and when you urinate.    11. Do not douche.  Baking soda soaks or rinsing with warm water will help rinse away extra discharge and help with odor.    12. Do not shave or use hair removal products on the vulvar area.  You may use scissors to trim the pubic hair close to the vulva.  Laser hair removal is an option.    13. Some women may have problems with chronic dampness.  Keeping dry is important; however, if at all possible, do not wear pads on a daily basis. Choose cotton fabrics whenever you can.  Keep an extra pair of underwear with you and change if you become damp. GOLD BOND or ZEASORB powder may be applied to the vulva and groin area 1 to 2 times per day to help absorb moisture in especially hot times of year; however, do not use this regularly if possible.  Do not use powders that contain cornstarch.    14. Dryness and irritation during intercourse may be helped by using a lubricant.  Use a small amount of a pure vegetable oil (solid, liquid, or extra virgin olive oil).  These oils contain no chemicals to irritate vulvar/vaginal skin.  Vegetable oils will rinse away with water and will not increase your chances of infection.  Over-the-counter water-based lubricants tend to dry out before intercourse is over, causing small tears in the vagina, and may also contain chemicals that can irritate your vulvar skin.  It may be helpful to use a non-lubricated, non-spermicidal condom, and use vegetable oil as the lubricant.  This will help keep the semen off the skin which can decrease burning and irritation after intercourse.    BIRTH CONTROL OPTIONS  1. The new low-dose oral birth control pills do not increase your chances of getting a yeast infection.    2. Lubricated condoms, contraceptive jellies, creams, or sponges may cause itching and burning.    3. The use of latex condoms with a vegetable oil as a lubricant (#14 above) is suggested to protect your skin. Petroleum-based lubricants may affect the integrity of condoms when used for birth control or prevention of sexually transmitted infections.  Our experience has not found this to be a problem with vegetable-based oils.  However, the Centers for Disease Control recommend that condoms not be used with any oil-based lubricants for birth control or prevention of sexually transmitted disease.

## 2021-03-18 NOTE — PATIENT INSTRUCTIONS
- Lichen simplex chronicus  - Vulvar dermatitis/contact dermitis  - Prescriptions were sent for Nystatin ointment (anti-fungal) and triamcinolone ointment (low dose topical steroid)  - Use nystatin/triamcinolone as directed starting with 2-3 applications daily and decreasing applications by half every couple of weeks.   - Use skin protectant at all other times if possible.       VULVAR SKIN CARE GUIDELINES    NOTE:  The goal is to promote healthy vulvar skin.  This is done by decreasing and removing chemicals, moisture, or rubbing (friction).  Products listed below have been suggested for use because of their past success in helping to decrease or relieve vulvar/vaginal burning, irritation, or itching. The list is not all inclusive and is not meant to promote one product over another.     LAUNDRY PRODUCTS  1. Use the detergent brand ALL FREE CLEAR on all laundry that goes into your washer, every load, and every time.  NO SUBSTITUTIONS.  Use 1/3 to 1/2 the suggested amount per load. For high efficiency machines, use a very small amount of detergent and consider using an extra rinse cycle on undergarments to remove as much detergent as possible.    2. Do not use fabric softeners or dryer sheets in the washer or dryer, even those advertised as  free .  If you use a shared washer or dryer, such as a Laundromat, apartment, or dorm you must hand wash, in ALL FREE CLEAR and line dry your underwear.  You can use dryer balls to help soften clothes.    3. Stain removing products (including bleach).  Soak and rinse in clear water all underwear and towels on which you have used a stain removing product.  Then wash in your regular washing cycle using ALL FREE CLEAR. This removes as much of the product as possible.  White vinegar or lemon juice, 1/4 to 1/3 cup per laundry load, can be used to refresh clothing and remove oils.    CLOTHING  1. Wear white all cotton underwear, not nylon with a cotton crotch.  Cotton allows air in  "and moisture out.  Do not wear underwear when sleeping at night.  Do not wear thongs.  Loose fitting cotton boxers or cotton pajama bottoms are fine.    2. Avoid tights.  If you must wear them, either cut out the brittaney crotch (if you cut out the crotch be sure to leave about 1/4 inch of fabric from the seam to prevent running) or wear thigh high panty-hose/tights.    3. Avoid tight clothing, especially clothing made of synthetic fabrics.  Remove wet bathing and exercise clothing as soon as you can.    BATHING AND HYGIENE  1. Do not use soap directly on the vulvar skin. Just warm water and your hand will keep the vulvar area clean without irritating the skin. Wash cloths or body poufs can be irritating, so just water and your hand are best. DO NOT use any sort of wipes on the vulvar area - this includes baby wipes (including water wipes), feminine hygiene wipes, or flushable cleansing wipes.    2. You may use anything you like on your face. For the rest of your body, do not use bath soaps, lotions, gels, etc. which contain perfumes. These can be irritating. This even includes many baby products and feminine hygiene products marked \"gentle\" or \"mild\".  DOVE FOR SENSITIVE SKIN, NEUTROGENA, BASIS, AVEENO, CETAPHIL Gentle Cleanser OR CERAVE are soaps we suggest. Your partner needs to use one of these soaps also.      2. Do not use bubble bath, bath salts, and scented oils.  You may apply a neutral (unscented, non-perfumed) oil or lotion to damp skin after getting out of the tub or shower.  Do not apply lotion directly to the vulva.    3. Do not scrub vulvar skin with a washcloth, washing with your hand and warm water is enough for good cleaning.    4. Pat dry rather than rubbing with a towel.  Or, use a hair dryer on a cool setting to dry the vulva.    5. Baking Soda soaks.  Soak in lukewarm (not hot) bath water with 4-5 tablespoons of baking soda to help soothe vulvar itching and burning.  Soak 1 to 3 times a day for " 10 minutes.  If you are using a sitz bath, use 1 to 2 teaspoons of baking soda.    6. Use white, unscented toilet paper.  Do not use toilet paper with aloe.    7. Do not use feminine hygiene sprays, perfumes, adult, or baby wipes.  You can use Tucks hemorrhoid pads.  If urine causes burning of the skin, pour lukewarm water over the vulva while urinating.  Pat dry rather than wiping.    8. Do not use deodorized pads and tampons.  Tampons may be used when the blood flow is heavy enough to soak one tampon in four hours or less. Tampons are safe for most women, but wearing them too long or when the blood flow is light may result in vaginal infection, increased discharge, odor, or toxic shock syndrome.  Also, use only pads that have a cotton liner, not nylon mesh weave, that comes in contact with your skin.  Nylon traps moisture and keeps blood and discharge against your skin longer.  We recommend STAYFREE, CAREFREE, or 7th GENERATION.    9. Do not use over-the-counter creams or ointments until you ask your health care provider.  When buying ointments, be sure that they are parable and fragrance-free.    10. Small amounts of zinc oxide ointment, plain Vaseline, extra virgin olive oil, or vegetable oil may be applied to your vulva as often as needed to protect the skin.  It also helps to decrease skin irritation during your period and when you urinate.    11. Do not douche.  Baking soda soaks or rinsing with warm water will help rinse away extra discharge and help with odor.    12. Do not shave or use hair removal products on the vulvar area.  You may use scissors to trim the pubic hair close to the vulva.  Laser hair removal is an option.    13. Some women may have problems with chronic dampness.  Keeping dry is important; however, if at all possible, do not wear pads on a daily basis. Choose cotton fabrics whenever you can.  Keep an extra pair of underwear with you and change if you become damp. GOLD BOND or ZEASORB  powder may be applied to the vulva and groin area 1 to 2 times per day to help absorb moisture in especially hot times of year; however, do not use this regularly if possible. Do not use powders that contain cornstarch.    14. Dryness and irritation during intercourse may be helped by using a lubricant.  Use a small amount of a pure vegetable oil (solid, liquid, or extra virgin olive oil).  These oils contain no chemicals to irritate vulvar/vaginal skin.  Vegetable oils will rinse away with water and will not increase your chances of infection.  Over-the-counter water-based lubricants tend to dry out before intercourse is over, causing small tears in the vagina, and may also contain chemicals that can irritate your vulvar skin.  It may be helpful to use a non-lubricated, non-spermicidal condom, and use vegetable oil as the lubricant.  This will help keep the semen off the skin which can decrease burning and irritation after intercourse.    BIRTH CONTROL OPTIONS  1. Low-dose oral birth control pills do not increase your chances of getting a yeast infection.    2. Lubricated condoms, contraceptive jellies, creams, or sponges may cause itching and burning.    3. The use of latex condoms with a vegetable oil as a lubricant (#14 above) is suggested to protect your skin. Petroleum-based lubricants may affect the integrity of condoms when used for birth control or prevention of sexually transmitted infections.  Our experience has not found this to be a problem with vegetable-based oils.  However, the Centers for Disease Control recommend that condoms not be used with any oil-based lubricants for birth control or prevention of sexually transmitted disease.

## 2021-03-31 ENCOUNTER — VIRTUAL VISIT (OUTPATIENT)
Dept: ONCOLOGY | Facility: CLINIC | Age: 46
End: 2021-03-31
Attending: OBSTETRICS & GYNECOLOGY
Payer: COMMERCIAL

## 2021-03-31 DIAGNOSIS — L28.0 LICHENIFICATION AND LICHEN SIMPLEX CHRONICUS: Primary | ICD-10-CM

## 2021-03-31 DIAGNOSIS — N91.2 AMENORRHEA: ICD-10-CM

## 2021-03-31 PROCEDURE — 99214 OFFICE O/P EST MOD 30 MIN: CPT | Mod: 24 | Performed by: OBSTETRICS & GYNECOLOGY

## 2021-03-31 PROCEDURE — 999N001193 HC VIDEO/TELEPHONE VISIT; NO CHARGE

## 2021-03-31 NOTE — LETTER
3/31/2021         RE: Sushila Nicholson  [address] [address]        Dear Colleague,    Thank you for referring your patient, Sushila Nicholson, to the [facility]. Please see a copy of my visit note below.    Sushila is a 46 year old who is being evaluated via a billable telephone visit.      What phone number would you like to be contacted at? [phone]  How would you like to obtain your AVS? Zakiahart        Phone call duration: 30 minutes    Georgie Higginbotham MA    Gynecologic Oncology Clinic - Established Patient Visit    Visit date: Mar 31, 2021     CC: lichen simplex chronicus    Interval history: Sushila Nicholson is a 46 year old with history of DEVIN III and more recently severe pruritis secondary to lichen simplex chronicus.    Twice a day for zinc, nystatin, and triamcinolone starting on 3/20. Dramatic improvement, however, Monday, when it was nice weather, she went for a walk and noted some itching/burning. Brought back a lot of anxiety about dysplasia. Concerns that this may be starting again. Much of this is due to how long it took before she had biopsy done to diagnose her dysplasia. No longer waking at night with itching.   She hasn't had a menses since October. Has seen Obgyn about this but hasn't completed work-up.    Review of Systems:  Gen: negative  : as per above  Psych: anxiety    Past Medical History:  Past Medical History:   Diagnosis Date     Acquired hypothyroidism      Anxiety      Bilateral occipital neuralgia      Disorder of thyroid        Past Surgical History:  Past Surgical History:   Procedure Laterality Date     BREAST BIOPSY, CORE RT/LT       EXCISE VULVA WIDE LOCAL Bilateral 1/29/2021    Procedure: Colposcopy, Wide local excision of vulva, CERVICAL BIOPSY, ENDOMETRIAL CURRETTAGE,;  Surgeon: Edilma Daley MD;  Location: [facility] OR        Physical Exam:  There were no vitals taken for this visit.   Phone  visit    Labs/Pathology:  na    Imaging review:  na    Assessment:  Sushila Nicholson is a 46 year old with history of DEVIN III and more recently severe pruritis secondary to lichen simplex chronicus improving on topical Mycolog (nystatin/triamcinolone) and skincare guidelines.    Plan:  - Lichen simple chronicus: continue triamcinolone and nystatin twice daily for 2 more weeks. Then transition to daily for 2-4 weeks then slowly continue to decrease use until PRN. Continue vulvar skincare guidelines and use of skin protectants.  - DEVIN III: follow-up with Dr. Daley as previously planned.  - Amenorrhea: recommend avoiding pelvic ultrasound for at least another 2-4 weeks to avoid inciting irritation. Labs to complete work-up ordered. She should follow-upwith general ob/gyn to review any further work-up or treatment. If she is not figueroa-menopausal, I do agree with using progesterone to induce menses at least every 3 months to avoid risks of hyperplasia.    I spoke with Sushila Nicholson for 30 minutes today about above.    Ciro Mcclellan MD     Gynecologic Oncology

## 2021-03-31 NOTE — PROGRESS NOTES
Sushila is a 46 year old who is being evaluated via a billable telephone visit.      What phone number would you like to be contacted at? [phone]  How would you like to obtain your AVS? MyChart        Phone call duration: 30 minutes    Georgie Higginbotham MA    Gynecologic Oncology Clinic - Established Patient Visit    Visit date: Mar 31, 2021     CC: lichen simplex chronicus    Interval history: Sushila Nicholson is a 46 year old with history of DEVIN III and more recently severe pruritis secondary to lichen simplex chronicus.    Twice a day for zinc, nystatin, and triamcinolone starting on 3/20. Dramatic improvement, however, Monday, when it was nice weather, she went for a walk and noted some itching/burning. Brought back a lot of anxiety about dysplasia. Concerns that this may be starting again. Much of this is due to how long it took before she had biopsy done to diagnose her dysplasia. No longer waking at night with itching.   She hasn't had a menses since October. Has seen Obgyn about this but hasn't completed work-up.    Review of Systems:  Gen: negative  : as per above  Psych: anxiety    Past Medical History:  Past Medical History:   Diagnosis Date     Acquired hypothyroidism      Anxiety      Bilateral occipital neuralgia      Disorder of thyroid        Past Surgical History:  Past Surgical History:   Procedure Laterality Date     BREAST BIOPSY, CORE RT/LT       EXCISE VULVA WIDE LOCAL Bilateral 1/29/2021    Procedure: Colposcopy, Wide local excision of vulva, CERVICAL BIOPSY, ENDOMETRIAL CURRETTAGE,;  Surgeon: Edilma Daley MD;  Location: [facility] OR        Physical Exam:  There were no vitals taken for this visit.   Phone visit    Labs/Pathology:  na    Imaging review:  na    Assessment:  Sushila Nicholson is a 46 year old with history of DEVIN III and more recently severe pruritis secondary to lichen simplex chronicus improving on topical Mycolog (nystatin/triamcinolone) and skincare guidelines.    Plan:  -  Lichen simple chronicus: continue triamcinolone and nystatin twice daily for 2 more weeks. Then transition to daily for 2-4 weeks then slowly continue to decrease use until PRN. Continue vulvar skincare guidelines and use of skin protectants.  - DEVIN III: follow-up with Dr. Daley as previously planned.  - Amenorrhea: recommend avoiding pelvic ultrasound for at least another 2-4 weeks to avoid inciting irritation. Labs to complete work-up ordered. She should follow-upwith general ob/gyn to review any further work-up or treatment. If she is not figueroa-menopausal, I do agree with using progesterone to induce menses at least every 3 months to avoid risks of hyperplasia.    I spoke with Sushila Nicholson for 30 minutes today about above.    Ciro Mcclellan MD     Gynecologic Oncology

## 2021-04-01 NOTE — PATIENT INSTRUCTIONS
Plan keep follow up with Dr Daley  Lab work ordered today to be done locally. Orders faxed to [facility]

## 2021-08-11 NOTE — PROGRESS NOTES
GYNECOLOGIC  ONCOLOGY CLINIC NOTE      RE: Sushila Nicholson  : 1975  AMELIE: 23      CC: Vulva Dysplasia    HPI: Ms Sushila Nicholson is a 46 year old  female who presents for follow up regarding surgically managed DEVNI III in 2021.     Today she reports overall doing well. Over the last week she noted irritation along distal vagina, intermittent. She is wondering if this is an early yeast infection or due to her pH.  In late spring she had two episodes of sinusitis and after the antibiotics she always develops yeast infections. She has resumed regular menses and is following up with Gyn on adjustment of her OCP. No significant symptom of vulvar pruritis.    Treatment History:    Patient reports recent symptoms of vulva irritation sine . Previous attempt at treatment with estradiol, steroid and antifungal cream did not resolve the issue.   LMP in 2020. She was scheduled to undergo a vaginal pelvic US but could not tolerate this due to the vulva lesions, she prefers to follow up on this issue separately with her local OB/GYN    20 Lesion along perianal area (midline and along right)  Biopsy- high grade squamous intraepithelial lesion  HSV Type 1/2 negative    2021 Pap- NILM, HPV 18 +    2021 Surgery: 1. Exam under anesthesia, Colposcopy with cervical biopsy and endocervical curettage, Vulvar colposcopy, Wide local excision of vulva  Pathology- FINAL DIAGNOSIS:   A. Unremarkable Benign cervical epithelium; negative for dysplasia     B. ENDOCERVIX, CURETTAGE:   - Low grade squamous intraepithelial lesion (cervical intraepithelial   neoplasia 1)     C. 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)   - Margins are negative for high grade dysplasia       OBGYN history and Health Maintenance:  G0  Last Pap Smear: 2021 Pap- NILM, HPV 18 +  HPV Vaccine in her 's      Review of Systems:  See patient completed  LEOLA, reviewed    Past Medical History:   Diagnosis Date     Acquired hypothyroidism      Anxiety      Bilateral occipital neuralgia      Disorder of thyroid        Past Surgical History:   Procedure Laterality Date     BREAST BIOPSY, CORE RT/LT       EXCISE VULVA WIDE LOCAL Bilateral 1/29/2021    Procedure: Colposcopy, Wide local excision of vulva, CERVICAL BIOPSY, ENDOMETRIAL CURRETTAGE,;  Surgeon: Edilma Daley MD;  Location:  OR          Current Outpatient Medications   Medication Sig Dispense Refill     acetaminophen (TYLENOL) 325 MG tablet Take 2 tablets (650 mg) by mouth every 4 hours as needed for mild pain 50 tablet 0     azelastine (ASTELIN) 0.1 % nasal spray Spray 1 spray in nostril       DHEA 25 MG CAPS Take 5 mg by mouth       Drospiren-Eth Estrad-Levomefol 3-0.02-0.451 MG TABS Take 1 tablet by mouth       fish oil-omega-3 fatty acids 1000 MG capsule Take 1 capsule by mouth       fluconazole (DIFLUCAN) 150 MG tablet        ibuprofen (ADVIL/MOTRIN) 600 MG tablet Take 1 tablet (600 mg) by mouth every 6 hours as needed for other (mild and/or inflammatory pain) 30 tablet 0     lidocaine (XYLOCAINE) 2 % external gel AS DIRECTED FOR 1 DOSE       methocarbamol (ROBAXIN) 750 MG tablet Take 750 mg by mouth       multivitamin w/minerals (MULTI-VITAMIN) tablet Take 1 tablet by mouth daily       NALTREXONE HCL PO Take by mouth daily Take 0.5mg (one tab) in the AM and 4.5mg (one tab) in the evening. LOW DOSE       nystatin (MYCOSTATIN) 889211 UNIT/GM external ointment Apply twice daily x 2 weeks, then decrease to daily x 2 weeks, then 2-3x/week until symptoms completely resolve then as needed. May mix with triamcinolone. 30 g 1     Probiotic Product (PROBIOTIC-10 PO)        spironolactone (ALDACTONE) 100 MG tablet Take 100 mg by mouth       SUMAtriptan (IMITREX) 50 MG tablet TAKE 1 TABLET(50 MG) BY MOUTH AT ONSET OF HEADACHE AS DIRECTED. MAY REPEAT IN 2 HOURS AS NEEDED       SYNTHROID 100 MCG tablet TAKE 1  TABLET BY MOUTH ONCE DAILY IN THE MORNING ON AN EMPTY STOMACH       triamcinolone (KENALOG) 0.1 % external ointment Apply twice daily x 2 weeks, then decrease to daily x 2 weeks, then 2-3x/week until symptoms completely resolve, then as needed. May mix with nystatin. 30 g 3     valACYclovir (VALTREX) 1000 mg tablet Take 1,000 mg by mouth daily       vitamin D3 (CHOLECALCIFEROL) 1.25 MG (37546 UT) capsule        zinc gluconate 50 MG tablet Take 50 mg by mouth daily       zinc oxide (DESITIN) 20 % external ointment Apply to the vulva multiple times daily as needed for irritation as skin protectant 60 g 3         Allergies   Allergen Reactions     Azithromycin Hives       Social History:  Social History     Tobacco Use     Smoking status: Never Smoker     Smokeless tobacco: Never Used   Substance Use Topics     Alcohol use: Yes     Comment: once a week         Family History:   The patient's family history is notable for no ovarian, breast or colon cancer  No family history on file.      Physical Exam:     BP (!) 138/94   Pulse 114   Temp 98  F (36.7  C) (Oral)   Wt 69.7 kg (153 lb 11.2 oz)   SpO2 99%   BMI 27.24 kg/m    Body mass index is 27.24 kg/m .    General: Alert and oriented, no acute distress  Psych: Mood stable  GI: No distention. No masses. No hernia.   Lymph: No enlarge lymph nodes in neck or groin  : Normal external genitalia. Vulva without lesions, prior site of incision is visible. After application of acetic acid solution no abnormal skin changes.       Assessment: Sushila Nicholson is a 46 year old woman s/p wide local excision for high grade vulva dysplasia, margins negative.     On today's exam no evidence of vaginal dysplasia    History of recurrent vulva/vaginal yeast infection    Cervical cancer surveillance- diagnosis of MECHE I post screening result of NILM/ HPV 18+      Plan:     1.)   Vulva Dysplasia risk of multifocal disease, recommend continued examination. Follow up in 6 months. Sushila montano  aware that recurrent symptoms of vulvar pain, itching or discomfort should be examined.     2.)   Cervical MECHE 1- follow up in 1 year for co-testing (1/2022)    3.)    Vulva/Vaginal dysplasia- prescribed Diflucan to be taken with onset of symptoms        Edilma Daley M.D., MPH,  F.A.C.O.G.  Professor  Department of Ob/Gyn and Women's Health  Division of Gynecologic Oncology  [facility] [place]  [phone]       Time: total time spent today, 8/12/2021, including preparation, review of outside records, face to face counseling, and documentation was 25 minutes.

## 2021-08-12 ENCOUNTER — ONCOLOGY VISIT (OUTPATIENT)
Dept: ONCOLOGY | Facility: CLINIC | Age: 46
End: 2021-08-12
Attending: OBSTETRICS & GYNECOLOGY
Payer: COMMERCIAL

## 2021-08-12 VITALS
TEMPERATURE: 98 F | HEART RATE: 114 BPM | SYSTOLIC BLOOD PRESSURE: 138 MMHG | WEIGHT: 153.7 LBS | DIASTOLIC BLOOD PRESSURE: 94 MMHG | OXYGEN SATURATION: 99 % | BODY MASS INDEX: 27.24 KG/M2

## 2021-08-12 DIAGNOSIS — B37.2 YEAST INFECTION OF THE SKIN: Primary | ICD-10-CM

## 2021-08-12 DIAGNOSIS — D07.1 VIN III (VULVAR INTRAEPITHELIAL NEOPLASIA III): ICD-10-CM

## 2021-08-12 PROCEDURE — 99214 OFFICE O/P EST MOD 30 MIN: CPT | Performed by: OBSTETRICS & GYNECOLOGY

## 2021-08-12 RX ORDER — FROVATRIPTAN SUCCINATE 2.5 MG/1
TABLET, FILM COATED ORAL
COMMUNITY
Start: 2021-08-10

## 2021-08-12 RX ORDER — ONDANSETRON 4 MG/1
4 TABLET, ORALLY DISINTEGRATING ORAL
COMMUNITY
Start: 2021-08-10 | End: 2022-08-10

## 2021-08-12 RX ORDER — DROSPIRENONE AND ETHINYL ESTRADIOL 0.03MG-3MG
1 KIT ORAL DAILY
COMMUNITY
Start: 2021-08-05 | End: 2024-01-04

## 2021-08-12 RX ORDER — RIZATRIPTAN BENZOATE 10 MG/1
TABLET, ORALLY DISINTEGRATING ORAL
COMMUNITY
Start: 2021-08-10

## 2021-08-12 RX ORDER — FLUCONAZOLE 150 MG/1
150 TABLET ORAL ONCE
Qty: 1 TABLET | Refills: 6 | Status: SHIPPED | OUTPATIENT
Start: 2021-08-12 | End: 2021-08-12

## 2021-08-12 ASSESSMENT — PAIN SCALES - GENERAL: PAINLEVEL: NO PAIN (0)

## 2021-08-12 NOTE — LETTER
2021         RE: Sushila Nicholson  [address] [facility] [address]        Dear Colleague,    Thank you for referring your patient, Sushila Nicholson, to the [facility]. Please see a copy of my visit note below.    GYNECOLOGIC  ONCOLOGY CLINIC NOTE      RE: Sushila Nicholson  : 1975  AMELIE: 23      CC: Vulva Dysplasia    HPI: Ms Sushila Nicholson is a 46 year old  female who presents for follow up regarding surgically managed DEVIN III in 2021.     Today she reports overall doing well. Over the last week she noted irritation along distal vagina, intermittent. She is wondering if this is an early yeast infection or due to her pH.  In late spring she had two episodes of sinusitis and after the antibiotics she always develops yeast infections. She has resumed regular menses and is following up with Gyn on adjustment of her OCP. No significant symptom of vulvar pruritis.    Treatment History:    Patient reports recent symptoms of vulva irritation sine . Previous attempt at treatment with estradiol, steroid and antifungal cream did not resolve the issue.   LMP in 2020. She was scheduled to undergo a vaginal pelvic US but could not tolerate this due to the vulva lesions, she prefers to follow up on this issue separately with her local OB/GYN    20 Lesion along perianal area (midline and along right)  Biopsy- high grade squamous intraepithelial lesion  HSV Type 1/2 negative    2021 Pap- NILM, HPV 18 +    2021 Surgery: 1. Exam under anesthesia, Colposcopy with cervical biopsy and endocervical curettage, Vulvar colposcopy, Wide local excision of vulva  Pathology- FINAL DIAGNOSIS:   A. Unremarkable Benign cervical epithelium; negative for dysplasia     B. ENDOCERVIX, CURETTAGE:   - Low grade squamous intraepithelial lesion (cervical intraepithelial   neoplasia 1)     C. 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)   - Margins are negative for high grade dysplasia       OBGYN history and Health Maintenance:  G0  Last Pap Smear: 1/14/2021 Pap- NILM, HPV 18 +  HPV Vaccine in her 20's      Review of Systems:  See patient completed ROS, reviewed    Past Medical History:   Diagnosis Date     Acquired hypothyroidism      Anxiety      Bilateral occipital neuralgia      Disorder of thyroid        Past Surgical History:   Procedure Laterality Date     BREAST BIOPSY, CORE RT/LT       EXCISE VULVA WIDE LOCAL Bilateral 1/29/2021    Procedure: Colposcopy, Wide local excision of vulva, CERVICAL BIOPSY, ENDOMETRIAL CURRETTAGE,;  Surgeon: Edilma Daley MD;  Location: [facility] OR          Current Outpatient Medications   Medication Sig Dispense Refill     acetaminophen (TYLENOL) 325 MG tablet Take 2 tablets (650 mg) by mouth every 4 hours as needed for mild pain 50 tablet 0     azelastine (ASTELIN) 0.1 % nasal spray Spray 1 spray in nostril       DHEA 25 MG CAPS Take 5 mg by mouth       Drospiren-Eth Estrad-Levomefol 3-0.02-0.451 MG TABS Take 1 tablet by mouth       fish oil-omega-3 fatty acids 1000 MG capsule Take 1 capsule by mouth       fluconazole (DIFLUCAN) 150 MG tablet        ibuprofen (ADVIL/MOTRIN) 600 MG tablet Take 1 tablet (600 mg) by mouth every 6 hours as needed for other (mild and/or inflammatory pain) 30 tablet 0     lidocaine (XYLOCAINE) 2 % external gel AS DIRECTED FOR 1 DOSE       methocarbamol (ROBAXIN) 750 MG tablet Take 750 mg by mouth       multivitamin w/minerals (MULTI-VITAMIN) tablet Take 1 tablet by mouth daily       NALTREXONE HCL PO Take by mouth daily Take 0.5mg (one tab) in the AM and 4.5mg (one tab) in the evening. LOW DOSE       nystatin (MYCOSTATIN) 845563 UNIT/GM external ointment Apply twice daily x 2 weeks, then decrease to daily x 2 weeks, then 2-3x/week until symptoms completely resolve then as needed. May mix with triamcinolone. 30 g 1     Probiotic Product  (PROBIOTIC-10 PO)        spironolactone (ALDACTONE) 100 MG tablet Take 100 mg by mouth       SUMAtriptan (IMITREX) 50 MG tablet TAKE 1 TABLET(50 MG) BY MOUTH AT ONSET OF HEADACHE AS DIRECTED. MAY REPEAT IN 2 HOURS AS NEEDED       SYNTHROID 100 MCG tablet TAKE 1 TABLET BY MOUTH ONCE DAILY IN THE MORNING ON AN EMPTY STOMACH       triamcinolone (KENALOG) 0.1 % external ointment Apply twice daily x 2 weeks, then decrease to daily x 2 weeks, then 2-3x/week until symptoms completely resolve, then as needed. May mix with nystatin. 30 g 3     valACYclovir (VALTREX) 1000 mg tablet Take 1,000 mg by mouth daily       vitamin D3 (CHOLECALCIFEROL) 1.25 MG (48464 UT) capsule        zinc gluconate 50 MG tablet Take 50 mg by mouth daily       zinc oxide (DESITIN) 20 % external ointment Apply to the vulva multiple times daily as needed for irritation as skin protectant 60 g 3         Allergies   Allergen Reactions     Azithromycin Hives       Social History:  Social History     Tobacco Use     Smoking status: Never Smoker     Smokeless tobacco: Never Used   Substance Use Topics     Alcohol use: Yes     Comment: once a week         Family History:   The patient's family history is notable for no ovarian, breast or colon cancer  No family history on file.      Physical Exam:     BP (!) 138/94   Pulse 114   Temp 98  F (36.7  C) (Oral)   Wt 69.7 kg (153 lb 11.2 oz)   SpO2 99%   BMI 27.24 kg/m    Body mass index is 27.24 kg/m .    General: Alert and oriented, no acute distress  Psych: Mood stable  GI: No distention. No masses. No hernia.   Lymph: No enlarge lymph nodes in neck or groin  : Normal external genitalia. Vulva without lesions, prior site of incision is visible. After application of acetic acid solution no abnormal skin changes.       Assessment: Sushila Nicholson is a 46 year old woman s/p wide local excision for high grade vulva dysplasia, margins negative.     On today's exam no evidence of vaginal dysplasia    History  of recurrent vulva/vaginal yeast infection    Cervical cancer surveillance- diagnosis of MECHE I post screening result of NILM/ HPV 18+      Plan:     1.)   Vulva Dysplasia risk of multifocal disease, recommend continued examination. Follow up in 6 months. Sushila is aware that recurrent symptoms of vulvar pain, itching or discomfort should be examined.     2.)   Cervical MECHE 1- follow up in 1 year for co-testing (1/2022)    3.)    Vulva/Vaginal dysplasia- prescribed Diflucan to be taken with onset of symptoms        Edilma Daley M.D., MPH,  F.A.C.O.G.  Professor  Department of Ob/Gyn and Women's Health  Division of Gynecologic Oncology  [facility] [place]  [phone]       Time: total time spent today, 8/12/2021, including preparation, review of outside records, face to face counseling, and documentation was 25 minutes.     Again, thank you for allowing me to participate in the care of your patient.        Sincerely,        Edilma Daley MD

## 2021-08-12 NOTE — NURSING NOTE
"Oncology Rooming Note    August 12, 2021 2:44 PM   Sushila Nicholson is a 46 year old female who presents for:    Chief Complaint   Patient presents with     Oncology Clinic Visit     vulvar intraepithelial neoplasia      Initial Vitals: BP (!) 138/94   Pulse 114   Temp 98  F (36.7  C) (Oral)   Wt 69.7 kg (153 lb 11.2 oz)   SpO2 99%   BMI 27.24 kg/m   Estimated body mass index is 27.24 kg/m  as calculated from the following:    Height as of 3/17/21: 1.6 m (5' 2.99\").    Weight as of this encounter: 69.7 kg (153 lb 11.2 oz). Body surface area is 1.76 meters squared.  No Pain (0) Comment: Data Unavailable   No LMP recorded. Patient is perimenopausal.  Allergies reviewed: Yes  Medications reviewed: Yes    Medications: Medication refills not needed today.  Pharmacy name entered into [employer]:    [id number] - [place] - [address] AT [facility] OF [address]  [id number] - [place] - [address]    Clinical concerns: none       Keisha White CMA              "

## 2021-10-11 ENCOUNTER — HEALTH MAINTENANCE LETTER (OUTPATIENT)
Age: 46
End: 2021-10-11

## 2022-02-28 ENCOUNTER — ONCOLOGY VISIT (OUTPATIENT)
Dept: ONCOLOGY | Facility: CLINIC | Age: 47
End: 2022-02-28
Attending: OBSTETRICS & GYNECOLOGY
Payer: COMMERCIAL

## 2022-02-28 VITALS
TEMPERATURE: 98.3 F | SYSTOLIC BLOOD PRESSURE: 143 MMHG | OXYGEN SATURATION: 97 % | BODY MASS INDEX: 25.87 KG/M2 | DIASTOLIC BLOOD PRESSURE: 79 MMHG | HEART RATE: 71 BPM | WEIGHT: 146 LBS | RESPIRATION RATE: 18 BRPM

## 2022-02-28 DIAGNOSIS — D07.1 VIN III (VULVAR INTRAEPITHELIAL NEOPLASIA III): Primary | ICD-10-CM

## 2022-02-28 PROCEDURE — 99214 OFFICE O/P EST MOD 30 MIN: CPT | Performed by: OBSTETRICS & GYNECOLOGY

## 2022-02-28 PROCEDURE — 87624 HPV HI-RISK TYP POOLED RSLT: CPT | Performed by: OBSTETRICS & GYNECOLOGY

## 2022-02-28 PROCEDURE — G0145 SCR C/V CYTO,THINLAYER,RESCR: HCPCS | Performed by: OBSTETRICS & GYNECOLOGY

## 2022-02-28 PROCEDURE — G0463 HOSPITAL OUTPT CLINIC VISIT: HCPCS

## 2022-02-28 RX ORDER — NITROFURANTOIN 25; 75 MG/1; MG/1
100 CAPSULE ORAL
COMMUNITY
Start: 2021-05-04 | End: 2023-03-17

## 2022-02-28 RX ORDER — CLINDAMYCIN PHOSPHATE 10 UG/ML
LOTION TOPICAL
COMMUNITY
Start: 2021-09-15

## 2022-02-28 RX ORDER — HYDROXYZINE HYDROCHLORIDE 25 MG/1
TABLET, FILM COATED ORAL
COMMUNITY
Start: 2021-12-21 | End: 2023-03-17

## 2022-02-28 ASSESSMENT — PAIN SCALES - GENERAL: PAINLEVEL: NO PAIN (0)

## 2022-02-28 NOTE — PROGRESS NOTES
GYNECOLOGIC  ONCOLOGY CLINIC NOTE      RE: Sushila Nicholson  : 1975  AMELIE: 24      CC: Vulva Dysplasia    HPI: Ms Sushila Nicholson is a 47 year old  female who presents for follow up for DEVIN III.    Today she reports she has been doing well, but has more stressors. Reports no discomfort around the vulva, no abnormal vaginal bleeding. She stopped taking OCP's.     Treatment History:    Patient reports recent symptoms of vulva irritation sine . Previous attempt at treatment with estradiol, steroid and antifungal cream did not resolve the issue.   LMP in 2020. She was scheduled to undergo a vaginal pelvic US but could not tolerate this due to the vulva lesions, she prefers to follow up on this issue separately with her local OB/GYN    20 Lesion along perianal area (midline and along right)  Biopsy- high grade squamous intraepithelial lesion  HSV Type 1/2 negative    2021 Pap- NILM, HPV 18 +    2021 Surgery: 1. Exam under anesthesia, Colposcopy with cervical biopsy and endocervical curettage, Vulvar colposcopy, Wide local excision of vulva  Pathology- FINAL DIAGNOSIS:   A. Unremarkable Benign cervical epithelium; negative for dysplasia     B. ENDOCERVIX, CURETTAGE:   - Low grade squamous intraepithelial lesion (cervical intraepithelial   neoplasia 1)     C. 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)   - Margins are negative for high grade dysplasia       OBGYN history and Health Maintenance:  G0  Last Pap Smear: 2021 Pap- NILM, HPV 18 +  HPV Vaccine in her 20's      Review of Systems:  See patient completed ROS, reviewed    Past Medical History:   Diagnosis Date     Acquired hypothyroidism      Anxiety      Bilateral occipital neuralgia      Disorder of thyroid        Past Surgical History:   Procedure Laterality Date     BREAST BIOPSY, CORE RT/LT       EXCISE VULVA WIDE LOCAL Bilateral 2021     Procedure: Colposcopy, Wide local excision of vulva, CERVICAL BIOPSY, ENDOMETRIAL CURRETTAGE,;  Surgeon: Edilma Daley MD;  Location: [facility] OR          Current Outpatient Medications   Medication Sig Dispense Refill     acetaminophen (TYLENOL) 325 MG tablet Take 2 tablets (650 mg) by mouth every 4 hours as needed for mild pain 50 tablet 0     DHEA 25 MG CAPS Take 10 mg by mouth        fish oil-omega-3 fatty acids 1000 MG capsule Take 1 capsule by mouth       fluconazole (DIFLUCAN) 150 MG tablet        frovatriptan (FROVA) 2.5 MG tablet 2 days before period take 2 tabs (5 mg) twice a day, then continue 1 tab (2.5 mg) twice a day for the next 5 days       ibuprofen (ADVIL/MOTRIN) 600 MG tablet Take 1 tablet (600 mg) by mouth every 6 hours as needed for other (mild and/or inflammatory pain) 30 tablet 0     lidocaine (XYLOCAINE) 2 % external gel AS DIRECTED FOR 1 DOSE       methocarbamol (ROBAXIN) 750 MG tablet Take 750 mg by mouth       multivitamin w/minerals (MULTI-VITAMIN) tablet Take 1 tablet by mouth daily       NALTREXONE HCL PO Take by mouth daily Take 0.5mg (one tab) in the AM and 4.5mg (one tab) in the evening. LOW DOSE       nystatin (MYCOSTATIN) 758863 UNIT/GM external ointment Apply twice daily x 2 weeks, then decrease to daily x 2 weeks, then 2-3x/week until symptoms completely resolve then as needed. May mix with triamcinolone. 30 g 1     ondansetron (ZOFRAN-ODT) 4 MG ODT tab Take 4 mg by mouth       Probiotic Product (PROBIOTIC-10 PO)        rizatriptan (MAXALT-MLT) 10 MG ODT Take 1 tablet at onset of headache; may repeat in 1-2 hrs. Only 2 tabs in 24hrs. Use only 2 days per wk.       spironolactone (ALDACTONE) 100 MG tablet Take 100 mg by mouth       SUMAtriptan (IMITREX) 50 MG tablet TAKE 1 TABLET(50 MG) BY MOUTH AT ONSET OF HEADACHE AS DIRECTED. MAY REPEAT IN 2 HOURS AS NEEDED       SYNTHROID 100 MCG tablet TAKE 1 TABLET BY MOUTH ONCE DAILY IN THE MORNING ON AN EMPTY STOMACH       triamcinolone  (KENALOG) 0.1 % external ointment Apply twice daily x 2 weeks, then decrease to daily x 2 weeks, then 2-3x/week until symptoms completely resolve, then as needed. May mix with nystatin. 30 g 3     valACYclovir (VALTREX) 1000 mg tablet Take 1,000 mg by mouth daily       vitamin D3 (CHOLECALCIFEROL) 1.25 MG (17618 UT) capsule        zinc gluconate 50 MG tablet Take 50 mg by mouth daily       zinc oxide (DESITIN) 20 % external ointment Apply to the vulva multiple times daily as needed for irritation as skin protectant 60 g 3     azelastine (ASTELIN) 0.1 % nasal spray Spray 1 spray in nostril (Patient not taking: Reported on 2/28/2022)       Drospiren-Eth Estrad-Levomefol 3-0.02-0.451 MG TABS Take 1 tablet by mouth (Patient not taking: Reported on 2/28/2022)       drospirenone-ethinyl estradiol (HERNANDO) 3-0.03 MG tablet Take 1 tablet by mouth daily (Patient not taking: Reported on 2/28/2022)           Allergies   Allergen Reactions     Azithromycin Hives       Social History:  Social History     Tobacco Use     Smoking status: Never Smoker     Smokeless tobacco: Never Used   Substance Use Topics     Alcohol use: Yes     Comment: once a week         Family History:   The patient's family history is notable for no ovarian, breast or colon cancer  No family history on file.      Physical Exam:     BP (!) 143/79 (BP Location: Left arm, Patient Position: Sitting, Cuff Size: Adult Regular)   Pulse 71   Temp 98.3  F (36.8  C) (Oral)   Resp 18   Wt 66.2 kg (146 lb)   SpO2 97%   BMI 25.87 kg/m    Body mass index is 25.87 kg/m .    General: Alert and oriented, no acute distress  Psych: Mood stable  GI: No distention. No masses. No hernia.   Lymph: No enlarge lymph nodes in neck or groin  : Normal external genitalia. Vagina/Cervix without lesions, Midline uterus, no adnexal mass. Vulva visualized after application of acetic acid, no abnormal lesions.   Pap smear obtained.      Assessment: Sushila Nicholson is a 46 year old  woman s/p wide local excision for high grade vulva dysplasia, margins negative in 1/2021.     On today's exam no evidence of vaginal dysplasia    Cervical cancer screening performed today with Pap/HPV tesit    Cervical cancer surveillance- diagnosis of MECEH I post screening result of NILM/ HPV 18+      Plan:     1.)   Vulva Dysplasia risk of multifocal disease, recommend continued examination. Follow up in 12 months. Sushila is aware that recurrent symptoms of vulvar pain, itching or discomfort should be examined. Sushila prefers to follow up in our clinic.     2.)   Cervical MECHE 1-Pap test done today, follow up for colposcopy pending results    3.)    Contraception- off OCPS' not currently a concern        Edilma Daley M.D., MPH,  F.A.C.O.G.  Professor  Department of Ob/Gyn and Women's Health  Division of Gynecologic Oncology  [facility] [place]  [phone]       Time: total time spent today, 2/28/2022, including preparation, review of outside records, face to face counseling, and documentation was 25 minutes.

## 2022-02-28 NOTE — LETTER
March 12, 2022      Sushila Nicholson  [address] [facility] [address]        Dear ,    We are writing to inform you of your test results.    Your test results are negative.      Resulted Orders   Pap screen with HPV - recommended age 30 - 65 years   Result Value Ref Range    Interpretation        Negative for Intraepithelial Lesion or Malignancy (NILM)    Comment         Papanicolaou Test Limitations:  Cervical cytology is a screening test with limited sensitivity, and regular screening is critical for cancer prevention.  Pap tests are primarily effective for the diagnosis/prevention of squamous cell carcinoma, not adenocarcinoma or other cancers.        Specimen Adequacy       Satisfactory for evaluation, endocervical/transformation zone component present    Clinical Information       none      LMP/Menopause Date       2/14/2022      Reflex Testing Yes regardless of result     Previous Abnormal?       No      Performing Labs       The technical component of this testing was completed at [facility]     HPV High Risk Types DNA Cervical   Result Value Ref Range    Other HR HPV Negative Negative    HPV16 DNA Negative Negative    HPV18 DNA Negative Negative    FINAL DIAGNOSIS       This patient's sample is negative for HPV DNA.        This test was developed and its performance characteristics determined by the [facility]. It has not been cleared or approved by the FDA. The laboratory is regulated under CLIA as qualified to perform high-complexity testing. This test is used for clinical purposes. It should not be regarded as investigational or for research.    METHODOLOGY: The Roche Gustavo 4800 system uses automated extraction, simultaneous amplification of HPV (L1 region) and beta-globin, followed by real time detection of fluorescent labeled HPV and beta globin using specific  oligonucleotide probes. The test specifically identified types HPV 16 DNA and HPV 18 DNA while concurrently detecting the rest of the high risk types (31, 33, 35, 39, 45, 51, 52, 56, 58, 59, 66 or 68).    COMMENTS: This test is not intended for use as a screening device for woman under age 30 with normal cervical cytology. Results should be correlated with cytologic and histologic findings. Close clinical followup is recommended.           If you have any questions or concerns, please call the clinic at the number listed above.       Sincerely,      Edilma Daley MD

## 2022-02-28 NOTE — LETTER
"    2022         RE: Sushila Nicholson  [address] [facility] [address]        Dear Colleague,    Thank you for referring your patient, Sushila Nicholson, to the [facility]. Please see a copy of my visit note below.    Oncology Rooming Note    2022 12:43 PM   Sushila Nicholson is a 47 year old female who presents for:    Chief Complaint   Patient presents with     Oncology Clinic Visit     DEVIN III (vulvar intraepithelial neoplasia III)     Initial Vitals: BP (!) 143/79 (BP Location: Left arm, Patient Position: Sitting, Cuff Size: Adult Regular)   Pulse 71   Temp 98.3  F (36.8  C) (Oral)   Resp 18   Wt 66.2 kg (146 lb)   SpO2 97%   BMI 25.87 kg/m   Estimated body mass index is 25.87 kg/m  as calculated from the following:    Height as of 3/17/21: 1.6 m (5' 2.99\").    Weight as of this encounter: 66.2 kg (146 lb). Body surface area is 1.72 meters squared.  No Pain (0) Comment: Data Unavailable   No LMP recorded. Patient is perimenopausal.  Allergies reviewed: Yes  Medications reviewed: Yes    Medications: Medication refills not needed today.  Pharmacy name entered into [employer]:    [facility][id number] - [place], [place] - [address] AT [facility] OF [address]  [facility] - [place], [place] - [address] [address] [facility]    Clinical concerns: no     Sherrell Sigala CMA                GYNECOLOGIC  ONCOLOGY CLINIC NOTE      RE: Sushila Nicholson  : 1975  AMELIE: 22      CC: Vulva Dysplasia    HPI: Ms Sushila Nicholson is a 47 year old  female who presents for follow up for DEVIN III.    Today she reports she has been doing well, but has more stressors. Reports no discomfort around the vulva, no abnormal vaginal bleeding. She stopped taking OCP's.     Treatment History:    Patient reports recent symptoms of vulva irritation sine . Previous attempt at treatment with estradiol, steroid and antifungal cream did not resolve the issue.   LMP in 2020. She " was scheduled to undergo a vaginal pelvic US but could not tolerate this due to the vulva lesions, she prefers to follow up on this issue separately with her local OB/GYN    12/28/20 Lesion along perianal area (midline and along right)  Biopsy- high grade squamous intraepithelial lesion  HSV Type 1/2 negative    1/14/2021 Pap- NILM, HPV 18 +    1/29/2021 Surgery: 1. Exam under anesthesia, Colposcopy with cervical biopsy and endocervical curettage, Vulvar colposcopy, Wide local excision of vulva  Pathology- FINAL DIAGNOSIS:   A. Unremarkable Benign cervical epithelium; negative for dysplasia     B. ENDOCERVIX, CURETTAGE:   - Low grade squamous intraepithelial lesion (cervical intraepithelial   neoplasia 1)     C. 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)   - Margins are negative for high grade dysplasia       OBGYN history and Health Maintenance:  G0  Last Pap Smear: 1/14/2021 Pap- NILM, HPV 18 +  HPV Vaccine in her 20's      Review of Systems:  See patient completed ROS, reviewed    Past Medical History:   Diagnosis Date     Acquired hypothyroidism      Anxiety      Bilateral occipital neuralgia      Disorder of thyroid        Past Surgical History:   Procedure Laterality Date     BREAST BIOPSY, CORE RT/LT       EXCISE VULVA WIDE LOCAL Bilateral 1/29/2021    Procedure: Colposcopy, Wide local excision of vulva, CERVICAL BIOPSY, ENDOMETRIAL CURRETTAGE,;  Surgeon: Edilma Daley MD;  Location: [facility] OR          Current Outpatient Medications   Medication Sig Dispense Refill     acetaminophen (TYLENOL) 325 MG tablet Take 2 tablets (650 mg) by mouth every 4 hours as needed for mild pain 50 tablet 0     DHEA 25 MG CAPS Take 10 mg by mouth        fish oil-omega-3 fatty acids 1000 MG capsule Take 1 capsule by mouth       fluconazole (DIFLUCAN) 150 MG tablet        frovatriptan (FROVA) 2.5 MG tablet 2 days before period take 2 tabs (5 mg)  twice a day, then continue 1 tab (2.5 mg) twice a day for the next 5 days       ibuprofen (ADVIL/MOTRIN) 600 MG tablet Take 1 tablet (600 mg) by mouth every 6 hours as needed for other (mild and/or inflammatory pain) 30 tablet 0     lidocaine (XYLOCAINE) 2 % external gel AS DIRECTED FOR 1 DOSE       methocarbamol (ROBAXIN) 750 MG tablet Take 750 mg by mouth       multivitamin w/minerals (MULTI-VITAMIN) tablet Take 1 tablet by mouth daily       NALTREXONE HCL PO Take by mouth daily Take 0.5mg (one tab) in the AM and 4.5mg (one tab) in the evening. LOW DOSE       nystatin (MYCOSTATIN) 269381 UNIT/GM external ointment Apply twice daily x 2 weeks, then decrease to daily x 2 weeks, then 2-3x/week until symptoms completely resolve then as needed. May mix with triamcinolone. 30 g 1     ondansetron (ZOFRAN-ODT) 4 MG ODT tab Take 4 mg by mouth       Probiotic Product (PROBIOTIC-10 PO)        rizatriptan (MAXALT-MLT) 10 MG ODT Take 1 tablet at onset of headache; may repeat in 1-2 hrs. Only 2 tabs in 24hrs. Use only 2 days per wk.       spironolactone (ALDACTONE) 100 MG tablet Take 100 mg by mouth       SUMAtriptan (IMITREX) 50 MG tablet TAKE 1 TABLET(50 MG) BY MOUTH AT ONSET OF HEADACHE AS DIRECTED. MAY REPEAT IN 2 HOURS AS NEEDED       SYNTHROID 100 MCG tablet TAKE 1 TABLET BY MOUTH ONCE DAILY IN THE MORNING ON AN EMPTY STOMACH       triamcinolone (KENALOG) 0.1 % external ointment Apply twice daily x 2 weeks, then decrease to daily x 2 weeks, then 2-3x/week until symptoms completely resolve, then as needed. May mix with nystatin. 30 g 3     valACYclovir (VALTREX) 1000 mg tablet Take 1,000 mg by mouth daily       vitamin D3 (CHOLECALCIFEROL) 1.25 MG (47950 UT) capsule        zinc gluconate 50 MG tablet Take 50 mg by mouth daily       zinc oxide (DESITIN) 20 % external ointment Apply to the vulva multiple times daily as needed for irritation as skin protectant 60 g 3     azelastine (ASTELIN) 0.1 % nasal spray Spray 1 spray in  nostril (Patient not taking: Reported on 2/28/2022)       Drospiren-Eth Estrad-Levomefol 3-0.02-0.451 MG TABS Take 1 tablet by mouth (Patient not taking: Reported on 2/28/2022)       drospirenone-ethinyl estradiol (HERNANDO) 3-0.03 MG tablet Take 1 tablet by mouth daily (Patient not taking: Reported on 2/28/2022)           Allergies   Allergen Reactions     Azithromycin Hives       Social History:  Social History     Tobacco Use     Smoking status: Never Smoker     Smokeless tobacco: Never Used   Substance Use Topics     Alcohol use: Yes     Comment: once a week         Family History:   The patient's family history is notable for no ovarian, breast or colon cancer  No family history on file.      Physical Exam:     BP (!) 143/79 (BP Location: Left arm, Patient Position: Sitting, Cuff Size: Adult Regular)   Pulse 71   Temp 98.3  F (36.8  C) (Oral)   Resp 18   Wt 66.2 kg (146 lb)   SpO2 97%   BMI 25.87 kg/m    Body mass index is 25.87 kg/m .    General: Alert and oriented, no acute distress  Psych: Mood stable  GI: No distention. No masses. No hernia.   Lymph: No enlarge lymph nodes in neck or groin  : Normal external genitalia. Vagina/Cervix without lesions, Midline uterus, no adnexal mass. Vulva visualized after application of acetic acid, no abnormal lesions.   Pap smear obtained.      Assessment: Sushila Nicholson is a 46 year old woman s/p wide local excision for high grade vulva dysplasia, margins negative in 1/2021.     On today's exam no evidence of vaginal dysplasia    Cervical cancer screening performed today with Pap/HPV tesit    Cervical cancer surveillance- diagnosis of MECHE I post screening result of NILM/ HPV 18+      Plan:     1.)   Vulva Dysplasia risk of multifocal disease, recommend continued examination. Follow up in 12 months. Sushila is aware that recurrent symptoms of vulvar pain, itching or discomfort should be examined. Sushila prefers to follow up in our clinic.     2.)   Cervical MECHE 1-Pap test  done today, follow up for colposcopy pending results    3.)    Contraception- off OCPS' not currently a concern        Edilma Daley M.D., MPH,  F.A.C.OPATY.  Professor  Department of Ob/Gyn and Women's Health  Division of Gynecologic Oncology  [facility]/[employer] [place]  [phone]       Time: total time spent today, 2/28/2022, including preparation, review of outside records, face to face counseling, and documentation was 25 minutes.               Again, thank you for allowing me to participate in the care of your patient.        Sincerely,        Edilma Daley MD

## 2022-02-28 NOTE — PROGRESS NOTES
"Oncology Rooming Note    February 28, 2022 12:43 PM   Sushila Nicholson is a 47 year old female who presents for:    Chief Complaint   Patient presents with     Oncology Clinic Visit     DEVIN III (vulvar intraepithelial neoplasia III)     Initial Vitals: BP (!) 143/79 (BP Location: Left arm, Patient Position: Sitting, Cuff Size: Adult Regular)   Pulse 71   Temp 98.3  F (36.8  C) (Oral)   Resp 18   Wt 66.2 kg (146 lb)   SpO2 97%   BMI 25.87 kg/m   Estimated body mass index is 25.87 kg/m  as calculated from the following:    Height as of 3/17/21: 1.6 m (5' 2.99\").    Weight as of this encounter: 66.2 kg (146 lb). Body surface area is 1.72 meters squared.  No Pain (0) Comment: Data Unavailable   No LMP recorded. Patient is perimenopausal.  Allergies reviewed: Yes  Medications reviewed: Yes    Medications: Medication refills not needed today.  Pharmacy name entered into [facility]:    [facility] #[id number] - [place] - [address] AT [facility] OF [address]  [facility] - [place] - [address] [address] [facility]    Clinical concerns: no     Sherrell Sigala CMA              "

## 2022-03-03 LAB
BKR LAB AP GYN ADEQUACY: NORMAL
BKR LAB AP GYN INTERPRETATION: NORMAL
BKR LAB AP HPV REFLEX: NORMAL
BKR LAB AP LMP: NORMAL
BKR LAB AP PREVIOUS ABNORMAL: NORMAL
PATH REPORT.COMMENTS IMP SPEC: NORMAL
PATH REPORT.COMMENTS IMP SPEC: NORMAL
PATH REPORT.RELEVANT HX SPEC: NORMAL

## 2022-03-07 LAB
HUMAN PAPILLOMA VIRUS 16 DNA: NEGATIVE
HUMAN PAPILLOMA VIRUS 18 DNA: NEGATIVE
HUMAN PAPILLOMA VIRUS FINAL DIAGNOSIS: NORMAL
HUMAN PAPILLOMA VIRUS OTHER HR: NEGATIVE

## 2022-03-27 ENCOUNTER — HEALTH MAINTENANCE LETTER (OUTPATIENT)
Age: 47
End: 2022-03-27

## 2022-09-24 ENCOUNTER — HEALTH MAINTENANCE LETTER (OUTPATIENT)
Age: 47
End: 2022-09-24

## 2023-03-17 ENCOUNTER — ONCOLOGY VISIT (OUTPATIENT)
Dept: ONCOLOGY | Facility: CLINIC | Age: 48
End: 2023-03-17
Attending: OBSTETRICS & GYNECOLOGY
Payer: COMMERCIAL

## 2023-03-17 VITALS
RESPIRATION RATE: 18 BRPM | SYSTOLIC BLOOD PRESSURE: 120 MMHG | TEMPERATURE: 98.7 F | HEART RATE: 114 BPM | OXYGEN SATURATION: 99 % | DIASTOLIC BLOOD PRESSURE: 84 MMHG | BODY MASS INDEX: 28.64 KG/M2 | WEIGHT: 161.6 LBS

## 2023-03-17 DIAGNOSIS — D07.1 VIN III (VULVAR INTRAEPITHELIAL NEOPLASIA III): ICD-10-CM

## 2023-03-17 DIAGNOSIS — A63.0 CONDYLOMA: Primary | ICD-10-CM

## 2023-03-17 PROCEDURE — G0463 HOSPITAL OUTPT CLINIC VISIT: HCPCS | Performed by: OBSTETRICS & GYNECOLOGY

## 2023-03-17 PROCEDURE — 99214 OFFICE O/P EST MOD 30 MIN: CPT | Performed by: OBSTETRICS & GYNECOLOGY

## 2023-03-17 RX ORDER — GABAPENTIN 100 MG/1
200 CAPSULE ORAL 3 TIMES DAILY PRN
COMMUNITY

## 2023-03-17 RX ORDER — IMIQUIMOD 12.5 MG/.25G
CREAM TOPICAL
Qty: 24 PACKET | Refills: 3 | Status: SHIPPED | OUTPATIENT
Start: 2023-03-17

## 2023-03-17 RX ORDER — MINOXIDIL 2.5 MG/1
2.5 TABLET ORAL DAILY
COMMUNITY

## 2023-03-17 RX ORDER — PROGESTERONE 100 MG/1
100 CAPSULE ORAL DAILY
COMMUNITY

## 2023-03-17 RX ORDER — MIRTAZAPINE 30 MG/1
45 TABLET, FILM COATED ORAL AT BEDTIME
COMMUNITY

## 2023-03-17 ASSESSMENT — PAIN SCALES - GENERAL: PAINLEVEL: NO PAIN (0)

## 2023-03-17 NOTE — PROGRESS NOTES
aldGYNECOLOGIC  ONCOLOGY CLINIC NOTE      RE: Sushila Nicholson  : 1975  AMELIE: 2023      CC: Vulva Dysplasia    HPI: Ms Sushila Nicholson is a 48 year old  female who presents for follow up for DEVIN III.    Today she reports she has noted irritation along left aspect of vulva, not sure if this is related to vulva yeast infection. She is working to improve her thyroid control.  She continues to have irregular cycles, now on daily Prometrium 100 mg and notes more breast tenderness. Overall she noted increasing weight gain.    Treatment History:  Patient reports recent symptoms of vulva irritation sine . Previous attempt at treatment with estradiol, steroid and antifungal cream did not resolve the issue.   LMP in 2020. She was scheduled to undergo a vaginal pelvic US but could not tolerate this due to the vulva lesions, she prefers to follow up on this issue separately with her local OB/GYN    20 Lesion along perianal area (midline and along right)  Biopsy- high grade squamous intraepithelial lesion  HSV Type 1/2 negative    2021 Pap- NILM, HPV 18 +    2021 Surgery: 1. Exam under anesthesia, Colposcopy with cervical biopsy and endocervical curettage, Vulvar colposcopy, Wide local excision of vulva  Pathology- FINAL DIAGNOSIS:   A. Unremarkable Benign cervical epithelium; negative for dysplasia     B. ENDOCERVIX, CURETTAGE:   - Low grade squamous intraepithelial lesion (cervical intraepithelial   neoplasia 1)     C. 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)   - Margins are negative for high grade dysplasia       OBGYN history and Health Maintenance:  G0  Last Pap Smear: 2022 Pap- NILM, HPV negative  HPV Vaccine in her 20's      Review of Systems:  See patient completed ROS, reviewed    Past Medical History:   Diagnosis Date     Acquired hypothyroidism      Anxiety      Bilateral occipital neuralgia       Disorder of thyroid        Past Surgical History:   Procedure Laterality Date     BREAST BIOPSY, CORE RT/LT       EXCISE VULVA WIDE LOCAL Bilateral 1/29/2021    Procedure: Colposcopy, Wide local excision of vulva, CERVICAL BIOPSY, ENDOMETRIAL CURRETTAGE,;  Surgeon: Edilma Daley MD;  Location:  OR          Current Outpatient Medications   Medication Sig Dispense Refill     acetaminophen (TYLENOL) 325 MG tablet Take 2 tablets (650 mg) by mouth every 4 hours as needed for mild pain 50 tablet 0     azelastine (ASTELIN) 0.1 % nasal spray Spray 1 spray in nostril (Patient not taking: Reported on 2/28/2022)       clindamycin (CLEOCIN T) 1 % external lotion        DHEA 25 MG CAPS Take 10 mg by mouth        Drospiren-Eth Estrad-Levomefol 3-0.02-0.451 MG TABS Take 1 tablet by mouth (Patient not taking: Reported on 2/28/2022)       drospirenone-ethinyl estradiol (HERNANDO) 3-0.03 MG tablet Take 1 tablet by mouth daily (Patient not taking: Reported on 2/28/2022)       fish oil-omega-3 fatty acids 1000 MG capsule Take 1 capsule by mouth       fluconazole (DIFLUCAN) 150 MG tablet        frovatriptan (FROVA) 2.5 MG tablet 2 days before period take 2 tabs (5 mg) twice a day, then continue 1 tab (2.5 mg) twice a day for the next 5 days       ibuprofen (ADVIL/MOTRIN) 600 MG tablet Take 1 tablet (600 mg) by mouth every 6 hours as needed for other (mild and/or inflammatory pain) 30 tablet 0     methocarbamol (ROBAXIN) 750 MG tablet Take 750 mg by mouth       multivitamin w/minerals (MULTI-VITAMIN) tablet Take 1 tablet by mouth daily       NALTREXONE HCL PO Take by mouth daily Take 0.5mg (one tab) in the AM and 4.5mg (one tab) in the evening. LOW DOSE       Probiotic Product (PROBIOTIC-10 PO)        rizatriptan (MAXALT-MLT) 10 MG ODT Take 1 tablet at onset of headache; may repeat in 1-2 hrs. Only 2 tabs in 24hrs. Use only 2 days per wk.       spironolactone (ALDACTONE) 100 MG tablet Take 100 mg by mouth       SYNTHROID 100 MCG  tablet TAKE 1 TABLET BY MOUTH ONCE DAILY IN THE MORNING ON AN EMPTY STOMACH       valACYclovir (VALTREX) 1000 mg tablet Take 1,000 mg by mouth daily       vitamin D3 (CHOLECALCIFEROL) 1.25 MG (60045 UT) capsule        zinc gluconate 50 MG tablet Take 50 mg by mouth daily       zinc oxide (DESITIN) 20 % external ointment Apply to the vulva multiple times daily as needed for irritation as skin protectant 60 g 3         Allergies   Allergen Reactions     Azithromycin Hives       Social History:  Social History     Tobacco Use     Smoking status: Never     Smokeless tobacco: Never   Substance Use Topics     Alcohol use: Yes     Comment: once a week         Family History:   The patient's family history is notable for no ovarian, breast or colon cancer  No family history on file.      Physical Exam:     /84 (BP Location: Right arm, Patient Position: Sitting, Cuff Size: Adult Regular)   Pulse 114   Temp 98.7  F (37.1  C) (Oral)   Resp 18   Wt 73.3 kg (161 lb 9.6 oz)   SpO2 99%   BMI 28.64 kg/m    Body mass index is 28.64 kg/m .    General: Alert and oriented, no acute distress  Psych: Mood stable  GI: No distention. No masses. No hernia.   : Vulva visualized after application of acetic acid. Perineum L>R noted punctate acetowhite epithelial changes c/w DEVIN I. Biopsy not obtained.  Distal vagina without lesions.      Assessment: Sushila Nicholson is a 46 year old woman s/p wide local excision for high grade vulva dysplasia, margins negative in 1/2021.   On today's exam the perineum has changes with HPV/mild dysplasia.       Plan:     1.)   DEVIN I-Discussed option of biopsy today vs a course of treatment with Aldara to enhance immune response. At this time Sushila would like to proceed with a treatment. Aldara applied to vulva 3 x week for 12 weeks and 4 weeks of treatment break and follow up for evaluation.  At that time if the area is worsened we will obtain biopsy.    Instructions on how to use Aldara was provided  and if not tolerated she can discontinue and follow up for exam in 3 months.     2.)   Cervical MECHE 1-last Pap/HPV negative, repeat in 3 year interval    3.)    Consider placement of Mirena IUD since she is symptomatic with Prometrium        Edilma Daley M.D., MPH,  F.A.C.O.G.  Professor  Department of Ob/Gyn and Women's Health  Division of Gynecologic Oncology  [facility]  [phone]       Time: total time spent today, March 17, 2023, including preparation, review of outside records, face to face counseling, and documentation was 25 minutes.

## 2023-03-17 NOTE — PATIENT INSTRUCTIONS
Follow up with Dr Daley in 4 months     Scheduling will reach out and assist with this     If you don't hear from them please feel free to call [phone] (option 5 option 2) they can help you get scheduled     Have a beautiful weekend     Juli

## 2023-03-17 NOTE — NURSING NOTE
"Oncology Rooming Note    March 17, 2023 3:12 PM   Sushila Nicholson is a 48 year old female who presents for:    Chief Complaint   Patient presents with     Oncology Clinic Visit     RETURN - DEVIN III      Initial Vitals: /84 (BP Location: Right arm, Patient Position: Sitting, Cuff Size: Adult Regular)   Pulse 114   Temp 98.7  F (37.1  C) (Oral)   Resp 18   Wt 73.3 kg (161 lb 9.6 oz)   SpO2 99%   BMI 28.64 kg/m   Estimated body mass index is 28.64 kg/m  as calculated from the following:    Height as of 3/17/21: 1.6 m (5' 2.99\").    Weight as of this encounter: 73.3 kg (161 lb 9.6 oz). Body surface area is 1.8 meters squared.  No Pain (0) Comment: Data Unavailable   No LMP recorded. Patient is perimenopausal.  Allergies reviewed: Yes  Medications reviewed: Yes    Medications: Medication refills not needed today.  Pharmacy name entered into [facility]:    [facility] #[id number] - [place] - [address] AT [facility] OF [address]  [facility] - [place] - [address] [address] [facility]    Clinical concerns: No new clinical concerns other than reason for visit today.      Nicole Aviles CMA            "

## 2023-03-17 NOTE — LETTER
3/17/2023         RE: Sushila Nicholson  [address] [facility] [address]        Dear Colleague,    Thank you for referring your patient, Sushila Nicholson, to the [facility]. Please see a copy of my visit note below.    aldGYNECOLOGIC  ONCOLOGY CLINIC NOTE      RE: Sushila Nicholson  : 1975  AMELIE: 2023      CC: Vulva Dysplasia    HPI: Ms Sushila Nicholson is a 48 year old  female who presents for follow up for DEVIN III.    Today she reports she has noted irritation along left aspect of vulva, not sure if this is related to vulva yeast infection. She is working to improve her thyroid control.  She continues to have irregular cycles, now on daily Prometrium 100 mg and notes more breast tenderness. Overall she noted increasing weight gain.    Treatment History:  Patient reports recent symptoms of vulva irritation sine . Previous attempt at treatment with estradiol, steroid and antifungal cream did not resolve the issue.   LMP in 2020. She was scheduled to undergo a vaginal pelvic US but could not tolerate this due to the vulva lesions, she prefers to follow up on this issue separately with her local OB/GYN    20 Lesion along perianal area (midline and along right)  Biopsy- high grade squamous intraepithelial lesion  HSV Type 1/2 negative    2021 Pap- NILM, HPV 18 +    2021 Surgery: 1. Exam under anesthesia, Colposcopy with cervical biopsy and endocervical curettage, Vulvar colposcopy, Wide local excision of vulva  Pathology- FINAL DIAGNOSIS:   A. Unremarkable Benign cervical epithelium; negative for dysplasia     B. ENDOCERVIX, CURETTAGE:   - Low grade squamous intraepithelial lesion (cervical intraepithelial   neoplasia 1)     C. 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)   - Margins are negative for high grade dysplasia       OBGYN history and Health  Maintenance:  G0  Last Pap Smear: 2/14/2022 Pap- NILM, HPV negative  HPV Vaccine in her 20's      Review of Systems:  See patient completed ROS, reviewed    Past Medical History:   Diagnosis Date     Acquired hypothyroidism      Anxiety      Bilateral occipital neuralgia      Disorder of thyroid        Past Surgical History:   Procedure Laterality Date     BREAST BIOPSY, CORE RT/LT       EXCISE VULVA WIDE LOCAL Bilateral 1/29/2021    Procedure: Colposcopy, Wide local excision of vulva, CERVICAL BIOPSY, ENDOMETRIAL CURRETTAGE,;  Surgeon: Edilma Daley MD;  Location:  OR          Current Outpatient Medications   Medication Sig Dispense Refill     acetaminophen (TYLENOL) 325 MG tablet Take 2 tablets (650 mg) by mouth every 4 hours as needed for mild pain 50 tablet 0     azelastine (ASTELIN) 0.1 % nasal spray Spray 1 spray in nostril (Patient not taking: Reported on 2/28/2022)       clindamycin (CLEOCIN T) 1 % external lotion        DHEA 25 MG CAPS Take 10 mg by mouth        Drospiren-Eth Estrad-Levomefol 3-0.02-0.451 MG TABS Take 1 tablet by mouth (Patient not taking: Reported on 2/28/2022)       drospirenone-ethinyl estradiol (HERNANDO) 3-0.03 MG tablet Take 1 tablet by mouth daily (Patient not taking: Reported on 2/28/2022)       fish oil-omega-3 fatty acids 1000 MG capsule Take 1 capsule by mouth       fluconazole (DIFLUCAN) 150 MG tablet        frovatriptan (FROVA) 2.5 MG tablet 2 days before period take 2 tabs (5 mg) twice a day, then continue 1 tab (2.5 mg) twice a day for the next 5 days       ibuprofen (ADVIL/MOTRIN) 600 MG tablet Take 1 tablet (600 mg) by mouth every 6 hours as needed for other (mild and/or inflammatory pain) 30 tablet 0     methocarbamol (ROBAXIN) 750 MG tablet Take 750 mg by mouth       multivitamin w/minerals (MULTI-VITAMIN) tablet Take 1 tablet by mouth daily       NALTREXONE HCL PO Take by mouth daily Take 0.5mg (one tab) in the AM and 4.5mg (one tab) in the evening. LOW DOSE        Probiotic Product (PROBIOTIC-10 PO)        rizatriptan (MAXALT-MLT) 10 MG ODT Take 1 tablet at onset of headache; may repeat in 1-2 hrs. Only 2 tabs in 24hrs. Use only 2 days per wk.       spironolactone (ALDACTONE) 100 MG tablet Take 100 mg by mouth       SYNTHROID 100 MCG tablet TAKE 1 TABLET BY MOUTH ONCE DAILY IN THE MORNING ON AN EMPTY STOMACH       valACYclovir (VALTREX) 1000 mg tablet Take 1,000 mg by mouth daily       vitamin D3 (CHOLECALCIFEROL) 1.25 MG (39739 UT) capsule        zinc gluconate 50 MG tablet Take 50 mg by mouth daily       zinc oxide (DESITIN) 20 % external ointment Apply to the vulva multiple times daily as needed for irritation as skin protectant 60 g 3         Allergies   Allergen Reactions     Azithromycin Hives       Social History:  Social History     Tobacco Use     Smoking status: Never     Smokeless tobacco: Never   Substance Use Topics     Alcohol use: Yes     Comment: once a week         Family History:   The patient's family history is notable for no ovarian, breast or colon cancer  No family history on file.      Physical Exam:     /84 (BP Location: Right arm, Patient Position: Sitting, Cuff Size: Adult Regular)   Pulse 114   Temp 98.7  F (37.1  C) (Oral)   Resp 18   Wt 73.3 kg (161 lb 9.6 oz)   SpO2 99%   BMI 28.64 kg/m    Body mass index is 28.64 kg/m .    General: Alert and oriented, no acute distress  Psych: Mood stable  GI: No distention. No masses. No hernia.   : Vulva visualized after application of acetic acid. Perineum L>R noted punctate acetowhite epithelial changes c/w DEVIN I. Biopsy not obtained.  Distal vagina without lesions.      Assessment: Sushila Nicholson is a 46 year old woman s/p wide local excision for high grade vulva dysplasia, margins negative in 1/2021.   On today's exam the perineum has changes with HPV/mild dysplasia.       Plan:     1.)   DEVIN I-Discussed option of biopsy today vs a course of treatment with Aldara to enhance immune response.  At this time Sushila would like to proceed with a treatment. Aldara applied to vulva 3 x week for 12 weeks and 4 weeks of treatment break and follow up for evaluation.  At that time if the area is worsened we will obtain biopsy.    Instructions on how to use Aldara was provided and if not tolerated she can discontinue and follow up for exam in 3 months.     2.)   Cervical MECHE 1-last Pap/HPV negative, repeat in 3 year interval    3.)    Consider placement of Mirena IUD since she is symptomatic with Prometrium        Edilma Daley M.D., MPH,  F.A.C.O.G.  Professor  Department of Ob/Gyn and Women's Health  Division of Gynecologic Oncology  [facility]/[place]  [phone]       Time: total time spent today, March 17, 2023, including preparation, review of outside records, face to face counseling, and documentation was 25 minutes.     Again, thank you for allowing me to participate in the care of your patient.        Sincerely,        Edilma Daley MD

## 2023-05-08 ENCOUNTER — HEALTH MAINTENANCE LETTER (OUTPATIENT)
Age: 48
End: 2023-05-08

## 2023-08-01 NOTE — PROGRESS NOTES
GYNECOLOGIC  ONCOLOGY CLINIC NOTE      RE: Sushila Nicholson  : 1975  AMELIE: 8/3/23      CC: Vulva Dysplasia    HPI: Ms Sushila Nicholson is a 48 year old  female who presents for follow up for DEVIN III.    Today Sushila overall reports feeling good. Her thyroid is under better control. She was able to use Aldara cream only several times, although did tolerate it.     Treatment History:  Patient reports recent symptoms of vulva irritation sine . Previous attempt at treatment with estradiol, steroid and antifungal cream did not resolve the issue.   LMP in 2020. She was scheduled to undergo a vaginal pelvic US but could not tolerate this due to the vulva lesions, she prefers to follow up on this issue separately with her local OB/GYN    20 Lesion along perianal area (midline and along right)  Biopsy- high grade squamous intraepithelial lesion  HSV Type 1/2 negative    2021 Pap- NILM, HPV 18 +    2021 Surgery: 1. Exam under anesthesia, Colposcopy with cervical biopsy and endocervical curettage, Vulvar colposcopy, Wide local excision of vulva  Pathology- FINAL DIAGNOSIS:   A. Unremarkable Benign cervical epithelium; negative for dysplasia     B. ENDOCERVIX, CURETTAGE:   - Low grade squamous intraepithelial lesion (cervical intraepithelial   neoplasia 1)     C. 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)   - Margins are negative for high grade dysplasia       OBGYN history and Health Maintenance:  G0  Last Pap Smear: 2022 Pap- NILM, HPV negative  HPV Vaccine in her 20's      Review of Systems:  See patient completed ROS, reviewed    Past Medical History:   Diagnosis Date    Acquired hypothyroidism     Anxiety     Bilateral occipital neuralgia     Disorder of thyroid        Past Surgical History:   Procedure Laterality Date    BREAST BIOPSY, CORE RT/LT      EXCISE VULVA WIDE LOCAL Bilateral 2021    Procedure:  Colposcopy, Wide local excision of vulva, CERVICAL BIOPSY, ENDOMETRIAL CURRETTAGE,;  Surgeon: Edilma Daley MD;  Location: [facility] OR          Current Outpatient Medications   Medication Sig Dispense Refill    acetaminophen (TYLENOL) 325 MG tablet Take 2 tablets (650 mg) by mouth every 4 hours as needed for mild pain (Patient not taking: Reported on 3/17/2023) 50 tablet 0    azelastine (ASTELIN) 0.1 % nasal spray Spray 1 spray in nostril (Patient not taking: Reported on 2/28/2022)      clindamycin (CLEOCIN T) 1 % external lotion       Drospiren-Eth Estrad-Levomefol 3-0.02-0.451 MG TABS Take 1 tablet by mouth (Patient not taking: Reported on 2/28/2022)      drospirenone-ethinyl estradiol (HERNANDO) 3-0.03 MG tablet Take 1 tablet by mouth daily (Patient not taking: Reported on 2/28/2022)      fish oil-omega-3 fatty acids 1000 MG capsule Take 1 capsule by mouth      fluconazole (DIFLUCAN) 150 MG tablet  (Patient not taking: Reported on 3/17/2023)      frovatriptan (FROVA) 2.5 MG tablet 2 days before period take 2 tabs (5 mg) twice a day, then continue 1 tab (2.5 mg) twice a day for the next 5 days (Patient not taking: Reported on 3/17/2023)      gabapentin (NEURONTIN) 100 MG capsule Take 200 mg by mouth 3 times daily as needed (anxiety) 200mg (2 caps) PRN anxiety TID      ibuprofen (ADVIL/MOTRIN) 600 MG tablet Take 1 tablet (600 mg) by mouth every 6 hours as needed for other (mild and/or inflammatory pain) 30 tablet 0    imiquimod (ALDARA) 5 % external cream Apply topically three times weekly at bedtime for 12 weeks to local area. 24 packet 3    levothyroxine (SYNTHROID/LEVOTHROID) 75 MCG tablet TAKE 1 TABLET BY MOUTH ONCE DAILY IN THE MORNING ON AN EMPTY STOMACH      methocarbamol (ROBAXIN) 750 MG tablet Take 750 mg by mouth      minoxidil (LONITEN) 2.5 MG tablet Take 2.5 mg by mouth daily Take 1/2 tab BID (morning and bedtime)      mirtazapine (REMERON) 30 MG tablet Take 45 mg by mouth At Bedtime 1.5 tabs at bedtime for  stress and anxiety      multivitamin w/minerals (THERA-VIT-M) tablet Take 1 tablet by mouth daily      NALTREXONE HCL PO Take by mouth daily Take 0.5mg (one tab) in the AM and 4.5mg (one tab) in the evening. LOW DOSE      Nutritional Supplements (DHEA PO) Take 15 mg by mouth      Probiotic Product (PROBIOTIC-10 PO)       progesterone (PROMETRIUM) 100 MG capsule Take 100 mg by mouth daily      rizatriptan (MAXALT-MLT) 10 MG ODT Take 1 tablet at onset of headache; may repeat in 1-2 hrs. Only 2 tabs in 24hrs. Use only 2 days per wk.      spironolactone (ALDACTONE) 50 MG tablet Take 50 mg by mouth      valACYclovir (VALTREX) 1000 mg tablet Take 1,000 mg by mouth daily      vitamin D3 (CHOLECALCIFEROL) 1.25 MG (56610 UT) capsule            Allergies   Allergen Reactions    Azithromycin Hives       Social History:  Social History     Tobacco Use    Smoking status: Never    Smokeless tobacco: Never   Substance Use Topics    Alcohol use: Yes     Comment: once a week         Family History:   The patient's family history is notable for no ovarian, breast or colon cancer  No family history on file.      Physical Exam:     BP (!) 142/81   Pulse 85   Temp 98.5  F (36.9  C) (Oral)   Wt 72.9 kg (160 lb 11.2 oz)   SpO2 96%   BMI 28.48 kg/m    Body mass index is 28.48 kg/m .    General: Alert and oriented, no acute distress  Psych: Mood stable  GI: No distention. No masses. No hernia.   : Vulva visualized after application of acetic acid. Perineum L>R noted punctate acetowhite epithelial changes c/w DEVIN I. Area looks same as last visit, no worsening lesions.   Distal vagina without lesions.  Biopsy not obtained.    Assessment: Sushila Nicholson is a 46 year old woman s/p wide local excision for high grade vulva dysplasia, margins negative in 1/2021.   On today's exam the perineum has changes with HPV/mild dysplasia, consistent with prior visit.    I informed Sushila that we can continue to observe it, DEVIN I does not need treatment  and may resolve with time and improvement of her overall immune function. Alternatively she can resume treatment with Aldara and return for exam in 4 months, needs 1 months off from using it prior to clinic visit. .     She wants to try the aldara.  Plan:     1.)   DEVIN I- At this time Sushila would like to proceed with a treatment. Aldara applied to vulva  1-2 x week for 12 weeks and 4 weeks of treatment break and follow up for evaluation.  At that time if the area is worsened we will obtain biopsy.    Instructions on how to use Aldara was provided and if not tolerated she can discontinue and follow up for exam in 3 months.     2.)   Cervical MECHE 1-last Pap/HPV negative, repeat in 3 year interval            Edilma Daley M.D., MPH,  F.A.C.O.G.  Professor  Department of Ob/Gyn and Women's Health  Division of Gynecologic Oncology  [facility]/[place]  [phone]       Time: total time spent today, 8/3/23, including preparation, review of outside records, face to face counseling, and documentation was 25 minutes.

## 2023-08-03 ENCOUNTER — ONCOLOGY VISIT (OUTPATIENT)
Dept: ONCOLOGY | Facility: CLINIC | Age: 48
End: 2023-08-03
Attending: OBSTETRICS & GYNECOLOGY
Payer: COMMERCIAL

## 2023-08-03 VITALS
WEIGHT: 160.7 LBS | HEART RATE: 85 BPM | TEMPERATURE: 98.5 F | BODY MASS INDEX: 28.48 KG/M2 | OXYGEN SATURATION: 96 % | SYSTOLIC BLOOD PRESSURE: 142 MMHG | DIASTOLIC BLOOD PRESSURE: 81 MMHG

## 2023-08-03 DIAGNOSIS — N90.0 VIN I (VULVAR INTRAEPITHELIAL NEOPLASIA I): Primary | ICD-10-CM

## 2023-08-03 PROCEDURE — G0463 HOSPITAL OUTPT CLINIC VISIT: HCPCS | Performed by: OBSTETRICS & GYNECOLOGY

## 2023-08-03 PROCEDURE — 99214 OFFICE O/P EST MOD 30 MIN: CPT | Performed by: OBSTETRICS & GYNECOLOGY

## 2023-08-03 RX ORDER — LIOTHYRONINE SODIUM 5 UG/1
5 TABLET ORAL DAILY
COMMUNITY

## 2023-08-03 ASSESSMENT — PAIN SCALES - GENERAL: PAINLEVEL: NO PAIN (0)

## 2023-08-03 NOTE — LETTER
8/3/2023         RE: Sushila Nicholson  [address] [facility] [address]        Dear Colleague,    Thank you for referring your patient, Sushila Nicholson, to the [facility]. Please see a copy of my visit note below.    GYNECOLOGIC  ONCOLOGY CLINIC NOTE      RE: Sushila Nicholson  : 1975  AMELIE: 8/3/23      CC: Vulva Dysplasia    HPI: Ms Sushila Nicholson is a 48 year old  female who presents for follow up for DEVIN III.    Today Sushila overall reports feeling good. Her thyroid is under better control. She was able to use Aldara cream only several times, although did tolerate it.     Treatment History:  Patient reports recent symptoms of vulva irritation sine . Previous attempt at treatment with estradiol, steroid and antifungal cream did not resolve the issue.   LMP in 2020. She was scheduled to undergo a vaginal pelvic US but could not tolerate this due to the vulva lesions, she prefers to follow up on this issue separately with her local OB/GYN    20 Lesion along perianal area (midline and along right)  Biopsy- high grade squamous intraepithelial lesion  HSV Type 1/2 negative    2021 Pap- NILM, HPV 18 +    2021 Surgery: 1. Exam under anesthesia, Colposcopy with cervical biopsy and endocervical curettage, Vulvar colposcopy, Wide local excision of vulva  Pathology- FINAL DIAGNOSIS:   A. Unremarkable Benign cervical epithelium; negative for dysplasia     B. ENDOCERVIX, CURETTAGE:   - Low grade squamous intraepithelial lesion (cervical intraepithelial   neoplasia 1)     C. 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)   - Margins are negative for high grade dysplasia       OBGYN history and Health Maintenance:  G0  Last Pap Smear: 2022 Pap- NILM, HPV negative  HPV Vaccine in her 's      Review of Systems:  See patient completed ROS, reviewed    Past Medical History:   Diagnosis  Date    Acquired hypothyroidism     Anxiety     Bilateral occipital neuralgia     Disorder of thyroid        Past Surgical History:   Procedure Laterality Date    BREAST BIOPSY, CORE RT/LT      EXCISE VULVA WIDE LOCAL Bilateral 1/29/2021    Procedure: Colposcopy, Wide local excision of vulva, CERVICAL BIOPSY, ENDOMETRIAL CURRETTAGE,;  Surgeon: Edilma Daley MD;  Location: [facility] OR          Current Outpatient Medications   Medication Sig Dispense Refill    acetaminophen (TYLENOL) 325 MG tablet Take 2 tablets (650 mg) by mouth every 4 hours as needed for mild pain (Patient not taking: Reported on 3/17/2023) 50 tablet 0    azelastine (ASTELIN) 0.1 % nasal spray Spray 1 spray in nostril (Patient not taking: Reported on 2/28/2022)      clindamycin (CLEOCIN T) 1 % external lotion       Drospiren-Eth Estrad-Levomefol 3-0.02-0.451 MG TABS Take 1 tablet by mouth (Patient not taking: Reported on 2/28/2022)      drospirenone-ethinyl estradiol (HERNANDO) 3-0.03 MG tablet Take 1 tablet by mouth daily (Patient not taking: Reported on 2/28/2022)      fish oil-omega-3 fatty acids 1000 MG capsule Take 1 capsule by mouth      fluconazole (DIFLUCAN) 150 MG tablet  (Patient not taking: Reported on 3/17/2023)      frovatriptan (FROVA) 2.5 MG tablet 2 days before period take 2 tabs (5 mg) twice a day, then continue 1 tab (2.5 mg) twice a day for the next 5 days (Patient not taking: Reported on 3/17/2023)      gabapentin (NEURONTIN) 100 MG capsule Take 200 mg by mouth 3 times daily as needed (anxiety) 200mg (2 caps) PRN anxiety TID      ibuprofen (ADVIL/MOTRIN) 600 MG tablet Take 1 tablet (600 mg) by mouth every 6 hours as needed for other (mild and/or inflammatory pain) 30 tablet 0    imiquimod (ALDARA) 5 % external cream Apply topically three times weekly at bedtime for 12 weeks to local area. 24 packet 3    levothyroxine (SYNTHROID/LEVOTHROID) 75 MCG tablet TAKE 1 TABLET BY MOUTH ONCE DAILY IN THE MORNING ON AN EMPTY STOMACH       methocarbamol (ROBAXIN) 750 MG tablet Take 750 mg by mouth      minoxidil (LONITEN) 2.5 MG tablet Take 2.5 mg by mouth daily Take 1/2 tab BID (morning and bedtime)      mirtazapine (REMERON) 30 MG tablet Take 45 mg by mouth At Bedtime 1.5 tabs at bedtime for stress and anxiety      multivitamin w/minerals (THERA-VIT-M) tablet Take 1 tablet by mouth daily      NALTREXONE HCL PO Take by mouth daily Take 0.5mg (one tab) in the AM and 4.5mg (one tab) in the evening. LOW DOSE      Nutritional Supplements (DHEA PO) Take 15 mg by mouth      Probiotic Product (PROBIOTIC-10 PO)       progesterone (PROMETRIUM) 100 MG capsule Take 100 mg by mouth daily      rizatriptan (MAXALT-MLT) 10 MG ODT Take 1 tablet at onset of headache; may repeat in 1-2 hrs. Only 2 tabs in 24hrs. Use only 2 days per wk.      spironolactone (ALDACTONE) 50 MG tablet Take 50 mg by mouth      valACYclovir (VALTREX) 1000 mg tablet Take 1,000 mg by mouth daily      vitamin D3 (CHOLECALCIFEROL) 1.25 MG (77822 UT) capsule            Allergies   Allergen Reactions    Azithromycin Hives       Social History:  Social History     Tobacco Use    Smoking status: Never    Smokeless tobacco: Never   Substance Use Topics    Alcohol use: Yes     Comment: once a week         Family History:   The patient's family history is notable for no ovarian, breast or colon cancer  No family history on file.      Physical Exam:     BP (!) 142/81   Pulse 85   Temp 98.5  F (36.9  C) (Oral)   Wt 72.9 kg (160 lb 11.2 oz)   SpO2 96%   BMI 28.48 kg/m    Body mass index is 28.48 kg/m .    General: Alert and oriented, no acute distress  Psych: Mood stable  GI: No distention. No masses. No hernia.   : Vulva visualized after application of acetic acid. Perineum L>R noted punctate acetowhite epithelial changes c/w DEVIN I. Area looks same as last visit, no worsening lesions.   Distal vagina without lesions.  Biopsy not obtained.    Assessment: Sushila Nicholson is a 46 year old woman s/p  wide local excision for high grade vulva dysplasia, margins negative in 1/2021.   On today's exam the perineum has changes with HPV/mild dysplasia, consistent with prior visit.    I informed Sushila that we can continue to observe it, DEVIN I does not need treatment and may resolve with time and improvement of her overall immune function. Alternatively she can resume treatment with Aldara and return for exam in 4 months, needs 1 months off from using it prior to clinic visit. .     She wants to try the aldara.  Plan:     1.)   DEVIN I- At this time Sushila would like to proceed with a treatment. Aldara applied to vulva  1-2 x week for 12 weeks and 4 weeks of treatment break and follow up for evaluation.  At that time if the area is worsened we will obtain biopsy.    Instructions on how to use Aldara was provided and if not tolerated she can discontinue and follow up for exam in 3 months.     2.)   Cervical MECHE 1-last Pap/HPV negative, repeat in 3 year interval        Edilma Daley M.D., MPH,  F.A.C.O.G.  Professor  Department of Ob/Gyn and Women's Health  Division of Gynecologic Oncology  [facility]/[place]  [phone]       Time: total time spent today, 8/3/23, including preparation, review of outside records, face to face counseling, and documentation was 25 minutes.

## 2023-08-03 NOTE — NURSING NOTE
"Oncology Rooming Note    August 3, 2023 3:04 PM   Sushila Nicholson is a 48 year old female who presents for:    Chief Complaint   Patient presents with    Oncology Clinic Visit     DEVIN III     Initial Vitals: BP (!) 142/81   Pulse 85   Temp 98.5  F (36.9  C) (Oral)   Wt 72.9 kg (160 lb 11.2 oz)   SpO2 96%   BMI 28.48 kg/m   Estimated body mass index is 28.48 kg/m  as calculated from the following:    Height as of 3/17/21: 1.6 m (5' 2.99\").    Weight as of this encounter: 72.9 kg (160 lb 11.2 oz). Body surface area is 1.8 meters squared.  No Pain (0) Comment: Data Unavailable   No LMP recorded. Patient is perimenopausal.  Allergies reviewed: Yes  Medications reviewed: Yes    Medications: Medication refills not needed today.  Pharmacy name entered into [email or web address]:    [id number] - [place] - [address] AT [facility] OF [address]  [id number] - [place], [place] - [address] [address] [facility]    Clinical concerns:        Jailene Romero              "

## 2024-01-04 ENCOUNTER — ONCOLOGY VISIT (OUTPATIENT)
Dept: ONCOLOGY | Facility: CLINIC | Age: 49
End: 2024-01-04
Attending: OBSTETRICS & GYNECOLOGY
Payer: COMMERCIAL

## 2024-01-04 VITALS
TEMPERATURE: 98.2 F | WEIGHT: 154.1 LBS | BODY MASS INDEX: 27.31 KG/M2 | HEART RATE: 111 BPM | SYSTOLIC BLOOD PRESSURE: 139 MMHG | OXYGEN SATURATION: 97 % | RESPIRATION RATE: 20 BRPM | DIASTOLIC BLOOD PRESSURE: 77 MMHG

## 2024-01-04 DIAGNOSIS — N90.0 VIN I (VULVAR INTRAEPITHELIAL NEOPLASIA I): ICD-10-CM

## 2024-01-04 DIAGNOSIS — D07.1 VIN III (VULVAR INTRAEPITHELIAL NEOPLASIA III): Primary | ICD-10-CM

## 2024-01-04 PROCEDURE — 99214 OFFICE O/P EST MOD 30 MIN: CPT | Performed by: OBSTETRICS & GYNECOLOGY

## 2024-01-04 PROCEDURE — 99213 OFFICE O/P EST LOW 20 MIN: CPT | Performed by: OBSTETRICS & GYNECOLOGY

## 2024-01-04 RX ORDER — PERPHENAZINE 4 MG
TABLET ORAL
COMMUNITY

## 2024-01-04 RX ORDER — LORAZEPAM 0.5 MG/1
.5-1 TABLET ORAL
COMMUNITY
Start: 2023-10-11 | End: 2024-04-08

## 2024-01-04 RX ORDER — OMEPRAZOLE 40 MG/1
1 CAPSULE, DELAYED RELEASE ORAL
COMMUNITY
Start: 2023-05-16 | End: 2024-05-15

## 2024-01-04 RX ORDER — LEVOTHYROXINE SODIUM 100 MCG
88 TABLET ORAL DAILY
COMMUNITY
Start: 2023-12-28

## 2024-01-04 RX ORDER — FERROUS GLUCONATE 324(37.5)
TABLET ORAL 2 TIMES DAILY
COMMUNITY

## 2024-01-04 RX ORDER — PREDNISONE 20 MG/1
TABLET ORAL
COMMUNITY
Start: 2023-11-14

## 2024-01-04 RX ORDER — ESCITALOPRAM OXALATE 20 MG/1
20 TABLET ORAL EVERY MORNING
COMMUNITY

## 2024-01-04 RX ORDER — PROCHLORPERAZINE MALEATE 5 MG
5-10 TABLET ORAL
COMMUNITY
Start: 2023-06-13 | End: 2024-06-12

## 2024-01-04 RX ORDER — ONDANSETRON 4 MG/1
4 TABLET, ORALLY DISINTEGRATING ORAL
COMMUNITY
Start: 2023-06-13 | End: 2024-06-12

## 2024-01-04 RX ORDER — RIMEGEPANT SULFATE 75 MG/75MG
75 TABLET, ORALLY DISINTEGRATING ORAL
COMMUNITY
Start: 2023-12-21 | End: 2024-12-20

## 2024-01-04 RX ORDER — LEVOTHYROXINE SODIUM 100 UG/1
88 TABLET ORAL DAILY
COMMUNITY

## 2024-01-04 RX ORDER — TRAZODONE HYDROCHLORIDE 50 MG/1
50-100 TABLET, FILM COATED ORAL
COMMUNITY
Start: 2023-11-22 | End: 2024-11-21

## 2024-01-04 ASSESSMENT — PAIN SCALES - GENERAL: PAINLEVEL: NO PAIN (0)

## 2024-01-04 NOTE — PROGRESS NOTES
Gynecologic Oncology Follow Up Visit     Referring provider:    Referred Self, MD  No address on file   RE: Sushila Nicholson  : 1975  AMELIE: 2024      CC: Vulva Dysplasia    HPI: Ms Sushila Nicholson is a 48 year old year old female with history of VINIII presents today for routine surveillance.     She notes no new areas of vulvar irritation or pruritis. She used Aldara cream once a week for 12 weeks and has been off it for > 4 weeks. Her thyroid issues are improving with current medication adjustment.      Treatment History:  Patient reports recent symptoms of vulva irritation sine . Previous attempt at treatment with estradiol, steroid and antifungal cream did not resolve the issue.   LMP in 2020. She was scheduled to undergo a vaginal pelvic US but could not tolerate this due to the vulva lesions, she prefers to follow up on this issue separately with her local OB/GYN     20 Lesion along perianal area (midline and along right)  Biopsy- high grade squamous intraepithelial lesion  HSV Type 1/2 negative     2021 Pap- NILM, HPV 18 +     2021 Surgery: 1. Exam under anesthesia, Colposcopy with cervical biopsy and endocervical curettage, Vulvar colposcopy, Wide local excision of vulva  Pathology- FINAL DIAGNOSIS:   A. Unremarkable Benign cervical epithelium; negative for dysplasia     B. ENDOCERVIX, CURETTAGE:   - Low grade squamous intraepithelial lesion (cervical intraepithelial   neoplasia 1)     C. 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)   - Margins are negative for high grade dysplasia     2023 Gyn Oncology Exam noted mild changes along perineum c/s mild dysplasia, no biopsy done, recommended to resume use of Aldara    Past Medical History:   Diagnosis Date    Acquired hypothyroidism     Anxiety     Bilateral occipital neuralgia     Disorder of thyroid        Past Surgical History:    Procedure Laterality Date    BREAST BIOPSY, CORE RT/LT      EXCISE VULVA WIDE LOCAL Bilateral 2021    Procedure: Colposcopy, Wide local excision of vulva, CERVICAL BIOPSY, ENDOMETRIAL CURRETTAGE,;  Surgeon: Edilma Daley MD;  Location:  OR        [facility] history and Health Maintenance (Personally reviewed 1/3/2024):    Last Pap Smear: -22 pap NILM, HPV negative  Last Mammogram:2022 Negative      Medications and allergies reviewed in EPIC    Social History:  Social History     Tobacco Use    Smoking status: Never    Smokeless tobacco: Never   Substance Use Topics    Alcohol use: Yes     Comment: once a week         Physical Exam:     /77 (BP Location: Right arm, Patient Position: Sitting, Cuff Size: Adult Regular)   Pulse 111   Temp 98.2  F (36.8  C) (Oral)   Resp 20   Wt 69.9 kg (154 lb 1.6 oz)   SpO2 97%   BMI 27.31 kg/m    Body mass index is 27.31 kg/m .    General: Alert and oriented, no acute distress  Psych: Mood stable. Linear speech, appropriate affect  Lymph: No enlarge lymph nodes in groin  : Vulva visualized after application of acetic acid, old scar noted, normal vulva skin, no atypia. Vagina without lesions, small cervix.    Assessment: Sushila Nicholson is a 48 year old woman with a new diagnosis of   DEVIN s/p wide local excision for high grade vulva dysplasia, margins negative in 2021.   On today's exam the perineum has improved with resolution of  with HPV/mild dysplasia after completion of Aldara treatment.      Plan:      1.)   DEVIN I- resolved, no additional treatment needed, follow up in 6 months for ongoing evaluation.    2.)   Cervical MECHE 1-last Pap/HPV negative, repeat in 3 year interval ()       Edilma Daley M.D., MPH,  F.A.C.O.G.  Professor  Department of Ob/Gyn and Women's Health  Division of Gynecologic Oncology  [facility]/[employer] [place]  [phone]  Time: total time spent today, 30 , including preparation, review of outside records,  face to face counseling, and documentation.

## 2024-01-04 NOTE — LETTER
2024         RE: Sushila Nicholson  [address] [address]        Dear Colleague,    Thank you for referring your patient, Sushila Nicholson, to the [facility]. Please see a copy of my visit note below.    Gynecologic Oncology Follow Up Visit     Referring provider:    Referred Self, MD  No address on file   RE: Sushila Nicholson  : 1975  AMELIE: 2024      CC: Vulva Dysplasia    HPI: Ms Sushila Nicholson is a 48 year old year old female with history of VINIII presents today for routine surveillance.     She notes no new areas of vulvar irritation or pruritis. She used Aldara cream once a week for 12 weeks and has been off it for > 4 weeks. Her thyroid issues are improving with current medication adjustment.      Treatment History:  Patient reports recent symptoms of vulva irritation sine . Previous attempt at treatment with estradiol, steroid and antifungal cream did not resolve the issue.   LMP in 2020. She was scheduled to undergo a vaginal pelvic US but could not tolerate this due to the vulva lesions, she prefers to follow up on this issue separately with her local OB/GYN     20 Lesion along perianal area (midline and along right)  Biopsy- high grade squamous intraepithelial lesion  HSV Type 1/2 negative     2021 Pap- NILM, HPV 18 +     2021 Surgery: 1. Exam under anesthesia, Colposcopy with cervical biopsy and endocervical curettage, Vulvar colposcopy, Wide local excision of vulva  Pathology- FINAL DIAGNOSIS:   A. Unremarkable Benign cervical epithelium; negative for dysplasia     B. ENDOCERVIX, CURETTAGE:   - Low grade squamous intraepithelial lesion (cervical intraepithelial   neoplasia 1)     C. 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)   - Margins are negative for high grade dysplasia     2023 Gyn Oncology Exam noted mild changes along  perineum c/s mild dysplasia, no biopsy done, recommended to resume use of Aldara    Past Medical History:   Diagnosis Date     Acquired hypothyroidism      Anxiety      Bilateral occipital neuralgia      Disorder of thyroid        Past Surgical History:   Procedure Laterality Date     BREAST BIOPSY, CORE RT/LT       EXCISE VULVA WIDE LOCAL Bilateral 2021    Procedure: Colposcopy, Wide local excision of vulva, CERVICAL BIOPSY, ENDOMETRIAL CURRETTAGE,;  Surgeon: Edilma Daley MD;  Location:  OR        [facility] history and Health Maintenance (Personally reviewed 1/3/2024):    Last Pap Smear: - pap NILM, HPV negative  Last Mammogram:2022 Negative      Medications and allergies reviewed in EPIC    Social History:  Social History     Tobacco Use     Smoking status: Never     Smokeless tobacco: Never   Substance Use Topics     Alcohol use: Yes     Comment: once a week         Physical Exam:     /77 (BP Location: Right arm, Patient Position: Sitting, Cuff Size: Adult Regular)   Pulse 111   Temp 98.2  F (36.8  C) (Oral)   Resp 20   Wt 69.9 kg (154 lb 1.6 oz)   SpO2 97%   BMI 27.31 kg/m    Body mass index is 27.31 kg/m .    General: Alert and oriented, no acute distress  Psych: Mood stable. Linear speech, appropriate affect  Lymph: No enlarge lymph nodes in groin  : Vulva visualized after application of acetic acid, old scar noted, normal vulva skin, no atypia. Vagina without lesions, small cervix.    Assessment: Sushila Nicholson is a 48 year old woman with a new diagnosis of   DEVIN s/p wide local excision for high grade vulva dysplasia, margins negative in 2021.   On today's exam the perineum has improved with resolution of  with HPV/mild dysplasia after completion of Aldara treatment.      Plan:      1.)   DEVIN I- resolved, no additional treatment needed, follow up in 6 months for ongoing evaluation.    2.)   Cervical MECHE 1-last Pap/HPV negative, repeat in 3 year interval ()       Edilma  ACACIA Daley, MPH,  NGHIA  Professor  Department of Ob/Gyn and Women's Health  Division of Gynecologic Oncology  [facility]/[employer] [place]  [phone]  Time: total time spent today, 30 , including preparation, review of outside records, face to face counseling, and documentation.       Again, thank you for allowing me to participate in the care of your patient.        Sincerely,        Edilma Daley MD

## 2024-01-04 NOTE — PATIENT INSTRUCTIONS
It was a pleasure seeing you in clinic today-please reach out if there are any further questions that arise following today's visit.  During business hours, you may reach me at [phone].  For urgent/emergent questions after business hours, you may reach the on-call Gynecologic Oncology Resident through the [facility]  at [phone].    All normal test results are usually communicated via letter or GANTEChart message.  Abnormal results (those that require a change in the previously discussed plan of care) are usually communicated via a phone call.    I recommend signing up for [employer] access if you have not already done so.  This allows you online access to your lab results and also helps you communicate efficiently with your clinic should any questions arise in your care.    Follow up appointment in 6 months with MD scheduling will call you to help make an appointment  chart/telephone with test results    Dr Thuy Todd RN  Phone:  [phone]  Fax:  [phone]

## 2024-01-04 NOTE — NURSING NOTE
"Oncology Rooming Note    January 4, 2024 3:46 PM   Sushila Nicholson is a 48 year old female who presents for:    Chief Complaint   Patient presents with    Oncology Clinic Visit     Vulvar intraepithelial neoplasia      Initial Vitals: /77 (BP Location: Right arm, Patient Position: Sitting, Cuff Size: Adult Regular)   Pulse 111   Temp 98.2  F (36.8  C) (Oral)   Resp 20   Wt 69.9 kg (154 lb 1.6 oz)   SpO2 97%   BMI 27.31 kg/m   Estimated body mass index is 27.31 kg/m  as calculated from the following:    Height as of 3/17/21: 1.6 m (5' 2.99\").    Weight as of this encounter: 69.9 kg (154 lb 1.6 oz). Body surface area is 1.76 meters squared.  No Pain (0) Comment: Data Unavailable   No LMP recorded. Patient is perimenopausal.  Allergies reviewed: Yes  Medications reviewed: Yes    Medications: Medication refills not needed today.  Pharmacy name entered into [facility]:    [id number] [place] - [address] AT [facility] [address]  [id number] - [place] - [address] [address] [facility]    Frailty Screening:   Is the patient here for a new oncology consult visit in cancer care? 2. No      Clinical concerns:  Was not called for follow up for appointment so was a little later    Ana Paml              "

## 2024-02-01 ENCOUNTER — TRANSFERRED RECORDS (OUTPATIENT)
Dept: HEALTH INFORMATION MANAGEMENT | Facility: CLINIC | Age: 49
End: 2024-02-01
Payer: COMMERCIAL

## 2024-02-01 ENCOUNTER — TRANSFERRED RECORDS (OUTPATIENT)
Dept: HEALTH INFORMATION MANAGEMENT | Facility: HOSPITAL | Age: 49
End: 2024-02-01

## 2024-02-01 LAB
CHOLESTEROL (EXTERNAL): 263 MG/DL
HBA1C MFR BLD: 5.2 %
HDLC SERPL-MCNC: 63 MG/DL
LDL CHOLESTEROL CALCULATED (EXTERNAL): 171 MG/DL
NON HDL CHOLESTEROL (EXTERNAL): 200 MG/DL
TRIGLYCERIDES (EXTERNAL): 143 MG/DL

## 2024-05-11 ENCOUNTER — HEALTH MAINTENANCE LETTER (OUTPATIENT)
Age: 49
End: 2024-05-11

## 2024-08-08 ENCOUNTER — ONCOLOGY VISIT (OUTPATIENT)
Dept: ONCOLOGY | Facility: CLINIC | Age: 49
End: 2024-08-08
Attending: OBSTETRICS & GYNECOLOGY
Payer: COMMERCIAL

## 2024-08-08 VITALS
RESPIRATION RATE: 16 BRPM | HEART RATE: 78 BPM | OXYGEN SATURATION: 97 % | SYSTOLIC BLOOD PRESSURE: 112 MMHG | TEMPERATURE: 98.4 F | BODY MASS INDEX: 26.81 KG/M2 | WEIGHT: 151.3 LBS | DIASTOLIC BLOOD PRESSURE: 74 MMHG

## 2024-08-08 DIAGNOSIS — D07.1 VIN III (VULVAR INTRAEPITHELIAL NEOPLASIA III): Primary | ICD-10-CM

## 2024-08-08 PROCEDURE — 99213 OFFICE O/P EST LOW 20 MIN: CPT | Performed by: OBSTETRICS & GYNECOLOGY

## 2024-08-08 RX ORDER — PROMETHAZINE HYDROCHLORIDE 25 MG/1
25 TABLET ORAL PRN
COMMUNITY
Start: 2024-05-20

## 2024-08-08 RX ORDER — LORAZEPAM 0.5 MG/1
.5-1 TABLET ORAL
COMMUNITY
Start: 2024-07-05

## 2024-08-08 RX ORDER — ATENOLOL 25 MG/1
25 TABLET ORAL 2 TIMES DAILY
COMMUNITY
Start: 2024-02-19

## 2024-08-08 RX ORDER — NAPROXEN SODIUM 550 MG/1
TABLET ORAL PRN
COMMUNITY

## 2024-08-08 RX ORDER — TOPIRAMATE 100 MG/1
100 TABLET, FILM COATED ORAL DAILY
COMMUNITY
Start: 2024-05-20

## 2024-08-08 RX ORDER — NYSTATIN 500000 [USP'U]/1
TABLET, COATED ORAL 3 TIMES DAILY
COMMUNITY
Start: 2024-07-02

## 2024-08-08 ASSESSMENT — PAIN SCALES - GENERAL: PAINLEVEL: NO PAIN (0)

## 2024-08-08 NOTE — PROGRESS NOTES
Gynecologic Oncology Follow Up Visit     Referring provider:    Referred Self, MD  No address on file   RE: Sushila Nicholson  : 1975  AMELIE: 2024     CC: Vulva Dysplasia    HPI: Ms Sushila Nicholson is a 49 year old female with history of VINIII presents today for routine surveillance.     She notes no new areas of vulvar irritation or pruritis. She continues to have multiple life stressors. Her thyroid issues have improved with current medication adjustment. Denies any vulvar lesions.      Treatment History:  Patient reports recent symptoms of vulva irritation sine . Previous attempt at treatment with estradiol, steroid and antifungal cream did not resolve the issue.   LMP in 2020. She was scheduled to undergo a vaginal pelvic US but could not tolerate this due to the vulva lesions, she prefers to follow up on this issue separately with her local OB/GYN     20 Lesion along perianal area (midline and along right)  Biopsy- high grade squamous intraepithelial lesion  HSV Type 1/2 negative     2021 Pap- NILM, HPV 18 +     2021 Surgery: 1. Exam under anesthesia, Colposcopy with cervical biopsy and endocervical curettage, Vulvar colposcopy, Wide local excision of vulva  Pathology- FINAL DIAGNOSIS:   A. Unremarkable Benign cervical epithelium; negative for dysplasia     B. ENDOCERVIX, CURETTAGE:   - Low grade squamous intraepithelial lesion (cervical intraepithelial   neoplasia 1)     C. 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)   - Margins are negative for high grade dysplasia     2023 Gyn Oncology Exam noted mild changes along perineum c/s mild dysplasia, no biopsy done, recommended to resume use of Aldara    2024 Gyn Onc Exam: Normal vulva exam    Past Medical History:   Diagnosis Date    Acquired hypothyroidism     Anxiety     Bilateral occipital neuralgia     Disorder of thyroid        Past  Surgical History:   Procedure Laterality Date    BREAST BIOPSY, CORE RT/LT      EXCISE VULVA WIDE LOCAL Bilateral 2021    Procedure: Colposcopy, Wide local excision of vulva, CERVICAL BIOPSY, ENDOMETRIAL CURRETTAGE,;  Surgeon: Edilma Daley MD;  Location:  OR        [facility] history and Health Maintenance (Personally reviewed 1/3/2024):    Last Pap Smear: - pap NILM, HPV negative  Last Mammogram:2022 Negative      Medications and allergies reviewed in EPIC    Social History:  Social History     Tobacco Use    Smoking status: Never    Smokeless tobacco: Never   Substance Use Topics    Alcohol use: Yes     Comment: once a week         Physical Exam:     /74 (BP Location: Right arm, Patient Position: Sitting, Cuff Size: Adult Regular)   Pulse 78   Temp 98.4  F (36.9  C) (Oral)   Resp 16   Wt 68.6 kg (151 lb 4.8 oz)   SpO2 97%   BMI 26.81 kg/m    Body mass index is 26.81 kg/m .    General: Alert and oriented, no acute distress  Psych: Mood stable. Linear speech, appropriate affect  Lymph: No enlarge lymph nodes in groin  : Vulva visualized after application of acetic acid, old scar noted, normal vulva skin, no atypia. Vagina without lesions, small cervix.    Assessment: Sushila Nicholson is a 49 year old woman with a new diagnosis of DEVIN III s/p wide local excision for high grade vulva dysplasia, margins negative in 2021.     On today's exam the perineum is normal. Last completed treatment for DEVIN 1, 6 months ago.      Plan:      1.)   DEVIN I- resolved, at this time we will plan for follow up in 1 year. She willl be do for cervical cancer screening at that time. If she has new vulva pruiritis or lesions, she can follow up in our clinic at anytime.    2.)   Cervical MECHE 1-last Pap/HPV negative, repeat in 3 year interval ()       Edilma Daley M.D., MPH,  F.A.C.O.G.  Professor  Department of Ob/Gyn and Women's Health  Division of Gynecologic Oncology  [facility]/[employer]  [place]  [phone]  Time: total time spent today, 20 , including preparation, review of outside records, face to face counseling, and documentation.

## 2024-08-08 NOTE — NURSING NOTE
"Oncology Rooming Note    August 8, 2024 3:40 PM   Sushila Nicholson is a 49 year old female who presents for:    Chief Complaint   Patient presents with    Oncology Clinic Visit     DEVIN III (vulvar intraepithelial neoplasia III)     Initial Vitals: /74 (BP Location: Right arm, Patient Position: Sitting, Cuff Size: Adult Regular)   Pulse 78   Temp 98.4  F (36.9  C) (Oral)   Resp 16   Wt 68.6 kg (151 lb 4.8 oz)   SpO2 97%   BMI 26.81 kg/m   Estimated body mass index is 26.81 kg/m  as calculated from the following:    Height as of 3/17/21: 1.6 m (5' 2.99\").    Weight as of this encounter: 68.6 kg (151 lb 4.8 oz). Body surface area is 1.75 meters squared.  No Pain (0) Comment: Data Unavailable   No LMP recorded. Patient is perimenopausal.  Allergies reviewed: Yes  Medications reviewed: Yes    Medications: Medication refills not needed today.  Pharmacy name entered into [email or web address]:    [id number] - [place], [place] - [address] AT [facility] [address]  [id number] - [place], [place] - [address] [address] [facility]    Frailty Screening:   Is the patient here for a new oncology consult visit in cancer care? 2. No      Clinical concerns: Pt reports no new concerns today.       Elli Brar, EMT   "

## 2024-08-08 NOTE — LETTER
2024      Sushila Nicholson  [address] [address]      Dear Colleague,    Thank you for referring your patient, Sushila Nicholson, to the [facility]CER [facility]. Please see a copy of my visit note below.    Gynecologic Oncology Follow Up Visit     Referring provider:    Referred Self, MD  No address on file   RE: Sushila Nicholson  : 1975  AMELIE: 2024     CC: Vulva Dysplasia    HPI: Ms Sushila Nicholson is a 49 year old female with history of VINIII presents today for routine surveillance.     She notes no new areas of vulvar irritation or pruritis. She continues to have multiple life stressors. Her thyroid issues have improved with current medication adjustment. Denies any vulvar lesions.      Treatment History:  Patient reports recent symptoms of vulva irritation sine . Previous attempt at treatment with estradiol, steroid and antifungal cream did not resolve the issue.   LMP in 2020. She was scheduled to undergo a vaginal pelvic US but could not tolerate this due to the vulva lesions, she prefers to follow up on this issue separately with her local OB/GYN     20 Lesion along perianal area (midline and along right)  Biopsy- high grade squamous intraepithelial lesion  HSV Type 1/2 negative     2021 Pap- NILM, HPV 18 +     2021 Surgery: 1. Exam under anesthesia, Colposcopy with cervical biopsy and endocervical curettage, Vulvar colposcopy, Wide local excision of vulva  Pathology- FINAL DIAGNOSIS:   A. Unremarkable Benign cervical epithelium; negative for dysplasia     B. ENDOCERVIX, CURETTAGE:   - Low grade squamous intraepithelial lesion (cervical intraepithelial   neoplasia 1)     C. 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)   - Margins are negative for high grade dysplasia     2023 Gyn Oncology Exam noted mild changes along perineum c/s mild dysplasia, no biopsy  done, recommended to resume use of Aldara    2024 Gyn Onc Exam: Normal vulva exam    Past Medical History:   Diagnosis Date     Acquired hypothyroidism      Anxiety      Bilateral occipital neuralgia      Disorder of thyroid        Past Surgical History:   Procedure Laterality Date     BREAST BIOPSY, CORE RT/LT       EXCISE VULVA WIDE LOCAL Bilateral 2021    Procedure: Colposcopy, Wide local excision of vulva, CERVICAL BIOPSY, ENDOMETRIAL CURRETTAGE,;  Surgeon: Edilma Daley MD;  Location:  OR        [facility] history and Health Maintenance (Personally reviewed 1/3/2024):    Last Pap Smear: - pap NILM, HPV negative  Last Mammogram:2022 Negative      Medications and allergies reviewed in EPIC    Social History:  Social History     Tobacco Use     Smoking status: Never     Smokeless tobacco: Never   Substance Use Topics     Alcohol use: Yes     Comment: once a week         Physical Exam:     /74 (BP Location: Right arm, Patient Position: Sitting, Cuff Size: Adult Regular)   Pulse 78   Temp 98.4  F (36.9  C) (Oral)   Resp 16   Wt 68.6 kg (151 lb 4.8 oz)   SpO2 97%   BMI 26.81 kg/m    Body mass index is 26.81 kg/m .    General: Alert and oriented, no acute distress  Psych: Mood stable. Linear speech, appropriate affect  Lymph: No enlarge lymph nodes in groin  : Vulva visualized after application of acetic acid, old scar noted, normal vulva skin, no atypia. Vagina without lesions, small cervix.    Assessment: Sushila Nicholson is a 49 year old woman with a new diagnosis of DEVIN III s/p wide local excision for high grade vulva dysplasia, margins negative in 2021.     On today's exam the perineum is normal. Last completed treatment for DEVIN 1, 6 months ago.      Plan:      1.)   DEVIN I- resolved, at this time we will plan for follow up in 1 year. She willl be do for cervical cancer screening at that time. If she has new vulva pruiritis or lesions, she can follow up in our clinic at  anytime.    2.)   Cervical MECHE 1-last Pap/HPV negative, repeat in 3 year interval (2025)       Edilma Daley M.D., MPH,  F.A.C.OPATY.  Professor  Department of Ob/Gyn and Women's Health  Division of Gynecologic Oncology  [facility]/[employer] [place]  [phone]  Time: total time spent today, 20 , including preparation, review of outside records, face to face counseling, and documentation.       Again, thank you for allowing me to participate in the care of your patient.        Sincerely,        Edilma Daley MD

## 2024-08-13 NOTE — PATIENT INSTRUCTIONS
It was a pleasure seeing you in clinic today-please reach out if there are any further questions that arise following today's visit.  During business hours, you may reach me at [phone].  For urgent/emergent questions after business hours, you may reach the on-call Gynecologic Oncology Resident through the [facility]  at [phone].    All normal test results are usually communicated via letter or tinyclueshart message.  Abnormal results (those that require a change in the previously discussed plan of care) are usually communicated via a phone call.    I recommend signing up for [employer] access if you have not already done so.  This allows you online access to your lab results and also helps you communicate efficiently with your clinic should any questions arise in your care.    Follow up appointment in 1 year with MD scheduling will call you to help make an appointment      Dr Thuy Todd RN  Phone:  [phone]  Fax:  [phone]

## 2024-11-30 ENCOUNTER — NURSE TRIAGE (OUTPATIENT)
Dept: NURSING | Facility: CLINIC | Age: 49
End: 2024-11-30
Payer: COMMERCIAL

## 2024-11-30 NOTE — TELEPHONE ENCOUNTER
Nurse Triage SBAR    Is this a 2nd Level Triage? NO    Situation:   Spoke with 49 yr old Sushila who c/o:  Vaginal itching and burning x 2 weeks.  (Thought yeast like symptoms)  Tried boric acid capsule once with relief for about one day and then symptoms returned.  Seen at [facility] in [place], [place] today and all labs were negative.  Patient would like a follow up with [facility] oncology OB/GYN and doesn't want to wait until next available.  Would prefer to Dr. Daley first, but is okay to see other providers of her team.  Declines triage of symptoms as patient was just seen by a provider today.      Background:   Bacterial vaginosis in Sept 2024  treated with antibiotic  Hx of pre cancer DEVIN lll 1/15/21  Previous encounters with current symptoms from 9/19/24 to 11/30/24 under [facility] in care everywhere.    Assessment:   Appointment request/information only     Protocol Recommended Disposition:   Routine specialty follow up appointment request.    Recommendation:   Advised to contact [facility] OB/GYN oncology care team when office is open on Monday 12/2/24.  Will message specialty clinic per patient request for ability to work patient into office schedule.    Kelsie Canales RN  [facility]    Reason for Disposition   Requesting regular office appointment    Additional Information   Negative: [1] Caller is not with the adult (patient) AND [2] reporting urgent symptoms   Negative: Lab result questions   Negative: Medication questions   Negative: Caller can't be reached by phone   Negative: Caller has already spoken to PCP or another triager   Negative: RN needs further essential information from caller in order to complete triage    Protocols used: Information Only Call-A-

## 2025-05-17 ENCOUNTER — HEALTH MAINTENANCE LETTER (OUTPATIENT)
Age: 50
End: 2025-05-17

## 2025-08-22 ENCOUNTER — PATIENT OUTREACH (OUTPATIENT)
Dept: ONCOLOGY | Facility: CLINIC | Age: 50
End: 2025-08-22
Payer: COMMERCIAL

## (undated) DEVICE — SUCTION MANIFOLD NEPTUNE 2 SYS 4 PORT 0702-020-000

## (undated) DEVICE — TUBING SMOKE EVAC ATTACHMENT E3590

## (undated) DEVICE — PACK VAG HYST

## (undated) DEVICE — DRSG TELFA 3X8" 1238

## (undated) DEVICE — SU SILK 3-0 SH 30" K832H

## (undated) DEVICE — GLOVE PROTEXIS MICRO 6.0  2D73PM60

## (undated) DEVICE — LINEN TOWEL PACK X6 WHITE 5487

## (undated) DEVICE — SWAB PROCTO 16" 2/PK 32-046

## (undated) DEVICE — SPECIMEN CONTAINER 5OZ STERILE 2600SA

## (undated) DEVICE — LINEN TOWEL PACK X5 5464

## (undated) DEVICE — GLOVE PROTEXIS BLUE W/NEU-THERA 6.5  2D73EB65

## (undated) DEVICE — LINEN GOWN XLG 5407

## (undated) DEVICE — BLADE KNIFE SURG 15 371115

## (undated) DEVICE — DRSG GAUZE 4X4" TRAY 6939

## (undated) DEVICE — TUBING SMOKE EVAC 1CMX3M SEA3710

## (undated) DEVICE — SU VICRYL 3-0 SH 27" UND J416H

## (undated) DEVICE — SU VICRYL 4-0 PS-2 18" UND J496H

## (undated) DEVICE — ESU PENCIL SMOKE EVAC W/ROCKER SWITCH 0703-047-000

## (undated) DEVICE — STRAP STIRRUP W/SLIP 30187-030

## (undated) RX ORDER — ONDANSETRON 2 MG/ML
INJECTION INTRAMUSCULAR; INTRAVENOUS
Status: DISPENSED
Start: 2021-01-29

## (undated) RX ORDER — LIDOCAINE HYDROCHLORIDE 20 MG/ML
INJECTION, SOLUTION EPIDURAL; INFILTRATION; INTRACAUDAL; PERINEURAL
Status: DISPENSED
Start: 2021-01-29

## (undated) RX ORDER — HYDROMORPHONE HYDROCHLORIDE 1 MG/ML
INJECTION, SOLUTION INTRAMUSCULAR; INTRAVENOUS; SUBCUTANEOUS
Status: DISPENSED
Start: 2021-01-29

## (undated) RX ORDER — ACETIC ACID 5 %
LIQUID (ML) MISCELLANEOUS
Status: DISPENSED
Start: 2021-01-29

## (undated) RX ORDER — CEFAZOLIN SODIUM 2 G/100ML
INJECTION, SOLUTION INTRAVENOUS
Status: DISPENSED
Start: 2021-01-29

## (undated) RX ORDER — FENTANYL CITRATE 50 UG/ML
INJECTION, SOLUTION INTRAMUSCULAR; INTRAVENOUS
Status: DISPENSED
Start: 2021-01-29

## (undated) RX ORDER — PROPOFOL 10 MG/ML
INJECTION, EMULSION INTRAVENOUS
Status: DISPENSED
Start: 2021-01-29

## (undated) RX ORDER — DEXAMETHASONE SODIUM PHOSPHATE 4 MG/ML
INJECTION, SOLUTION INTRA-ARTICULAR; INTRALESIONAL; INTRAMUSCULAR; INTRAVENOUS; SOFT TISSUE
Status: DISPENSED
Start: 2021-01-29

## (undated) RX ORDER — ACETAMINOPHEN 325 MG/1
TABLET ORAL
Status: DISPENSED
Start: 2021-01-29

## (undated) RX ORDER — GLYCOPYRROLATE 0.2 MG/ML
INJECTION, SOLUTION INTRAMUSCULAR; INTRAVENOUS
Status: DISPENSED
Start: 2021-01-29

## (undated) RX ORDER — BUPIVACAINE HYDROCHLORIDE 2.5 MG/ML
INJECTION, SOLUTION EPIDURAL; INFILTRATION; INTRACAUDAL
Status: DISPENSED
Start: 2021-01-29

## (undated) RX ORDER — FENTANYL CITRATE-0.9 % NACL/PF 10 MCG/ML
PLASTIC BAG, INJECTION (ML) INTRAVENOUS
Status: DISPENSED
Start: 2021-01-29

## (undated) RX ORDER — EPHEDRINE SULFATE 50 MG/ML
INJECTION, SOLUTION INTRAMUSCULAR; INTRAVENOUS; SUBCUTANEOUS
Status: DISPENSED
Start: 2021-01-29